# Patient Record
Sex: FEMALE | Race: WHITE | Employment: UNEMPLOYED | URBAN - METROPOLITAN AREA
[De-identification: names, ages, dates, MRNs, and addresses within clinical notes are randomized per-mention and may not be internally consistent; named-entity substitution may affect disease eponyms.]

---

## 2021-05-20 ENCOUNTER — OFFICE VISIT (OUTPATIENT)
Dept: SURGERY | Age: 46
End: 2021-05-20
Payer: OTHER GOVERNMENT

## 2021-05-20 VITALS
DIASTOLIC BLOOD PRESSURE: 100 MMHG | HEART RATE: 108 BPM | RESPIRATION RATE: 20 BRPM | HEIGHT: 63 IN | WEIGHT: 293 LBS | SYSTOLIC BLOOD PRESSURE: 194 MMHG | BODY MASS INDEX: 51.91 KG/M2 | TEMPERATURE: 99.5 F

## 2021-05-20 DIAGNOSIS — E66.01 MORBID OBESITY WITH BMI OF 50.0-59.9, ADULT (HCC): Primary | ICD-10-CM

## 2021-05-20 PROCEDURE — 99205 OFFICE O/P NEW HI 60 MIN: CPT | Performed by: SURGERY

## 2021-05-20 RX ORDER — MINERAL OIL
180 ENEMA (ML) RECTAL DAILY
COMMUNITY
End: 2022-05-24

## 2021-05-20 RX ORDER — POTASSIUM CHLORIDE 750 MG/1
TABLET, EXTENDED RELEASE ORAL
COMMUNITY
Start: 2021-02-22 | End: 2021-11-23

## 2021-05-20 RX ORDER — ACETAMINOPHEN 325 MG/1
TABLET ORAL
COMMUNITY

## 2021-05-20 RX ORDER — IPRATROPIUM BROMIDE AND ALBUTEROL 20; 100 UG/1; UG/1
SPRAY, METERED RESPIRATORY (INHALATION)
COMMUNITY
Start: 2021-04-26 | End: 2021-11-16

## 2021-05-20 RX ORDER — IPRATROPIUM BROMIDE AND ALBUTEROL SULFATE 2.5; .5 MG/3ML; MG/3ML
SOLUTION RESPIRATORY (INHALATION)
COMMUNITY
Start: 2021-02-22 | End: 2022-02-22

## 2021-05-20 RX ORDER — METFORMIN HYDROCHLORIDE 500 MG/1
500 TABLET ORAL 2 TIMES DAILY
COMMUNITY
Start: 2021-02-22 | End: 2021-11-16

## 2021-05-20 RX ORDER — OMEPRAZOLE 40 MG/1
40 CAPSULE, DELAYED RELEASE ORAL 2 TIMES DAILY
COMMUNITY
Start: 2021-01-06 | End: 2021-07-09

## 2021-05-20 RX ORDER — PROMETHAZINE HYDROCHLORIDE 12.5 MG/1
12.5 TABLET ORAL
COMMUNITY
End: 2021-11-16

## 2021-05-20 RX ORDER — LISINOPRIL 10 MG/1
10 TABLET ORAL DAILY
COMMUNITY
Start: 2021-02-22 | End: 2021-11-16

## 2021-05-20 RX ORDER — PREDNISONE 50 MG/1
TABLET ORAL
COMMUNITY
Start: 2021-02-22 | End: 2021-11-16

## 2021-05-20 RX ORDER — RIZATRIPTAN BENZOATE 5 MG/1
5 TABLET ORAL DAILY PRN
COMMUNITY
End: 2022-02-22

## 2021-05-20 RX ORDER — FUROSEMIDE 40 MG/1
40 TABLET ORAL DAILY
COMMUNITY
End: 2021-11-23

## 2021-05-20 RX ORDER — MONTELUKAST SODIUM 10 MG/1
10 TABLET ORAL
COMMUNITY

## 2021-05-20 RX ORDER — METOPROLOL SUCCINATE 50 MG/1
50 TABLET, EXTENDED RELEASE ORAL DAILY
COMMUNITY
Start: 2021-02-22 | End: 2021-11-23

## 2021-05-20 RX ORDER — GABAPENTIN 300 MG/1
300 CAPSULE ORAL 3 TIMES DAILY
COMMUNITY
End: 2022-02-22

## 2021-05-20 NOTE — PROGRESS NOTES
Matt Chapin is a 41 y.o. female who presents today with   Chief Complaint   Patient presents with    Morbid Obesity     Pt presents today to discuss surgical options            Body mass index is 54.21 kg/m². 1. Have you been to the ER, urgent care clinic since your last visit? Hospitalized since your last visit? No    2. Have you seen or consulted any other health care providers outside of the [address] [place] [address] since your last visit? Include any pap smears or colon screening.  No

## 2021-05-21 NOTE — PROGRESS NOTES
Consult    Patient: Kirk Vallejo MRN: [id number]  SSN: [id number]    YOB: 1975  Age: 41 y.o. Sex: female      Initial  Consultation for Bariatric Surgery     Kirk Vallejo is a 60-year-old white female who presents for discussion surgical options available for end of treatment of her clinically severe obesity. Onset obesity: Childhood  Weight at age 26: 190 pounds and a 5 foot 3 inch frame  Maximum/current weight 306 pounds on 5 foot 3 inch frame with body mass index of 54  Pattern/progression of weight gain: Slowly progressive interrupted by dietary weight loss followed by regain of lost weight as well as additional weight thus exhibiting yoyo effect after current maximum weight of 306 pounds  Max medical weight loss attempts: Multiple unsupervised and supervised weight loss trials with a maximum loss occurring in 2000 losing 35 pounds over 3 months  Comorbidities: Hypertension, Gastrosoft reflux disease, reactive airway disease, clinical obstructive sleep apnea, weight related arthropathy-knees, carpal tunnel tunnel syndrome/bilateral, polycystic ovarian syndrome  Current weight: 306. Body mass index 54. Ideal body weight: 128  Excess body weight: 178  Estimated postsurgical weight loss: 42  Postsurgical goal weight: 164  Allergies: Eucerin penicillin Reglan  Current medications: See medication list  Past medical history:  1. Super obesity with a body mass index of 54 with obesity related comorbidities of hypertension, Gastrosoft reflux disease, reactive airway disease, clinical obstructive sleep apnea, bilateral carpal tunnel syndrome, polycystic ovarian syndrome and weight related arthropathy-knees  2. Allergic rhinitis  3. History of nephrolithiasis status post ureteroscopy  4. History of peptic ulcer disease/perforated duodenal ulcer requiring laparoscopic Janith Bridgette patch 2015  Past surgical history:  1. Laparoscopic cholecystectomy 2000  2. Ureteroscopy x2 2006, 2013  3. Laparoscopic Doll Boards patch for perforated duodenal ulcer   Social history  Quit smoking in 2021 with a prior history approximately 20 years duration  Alcohol: None  Social history: Mother 72-clinic severe obesity, hypertension, coronary disease, adult-onset diabetes mellitus  Father  69-hypertension, congestive heart failure, renal failure  Sister 48-healthy    Allergies   Allergen Reactions    Eucerin [Mineral Oil-Isopropyl Myristat] Hives    Pcn [Penicillins] Hives    Reglan [Metoclopramide] Hives       Current Outpatient Medications on File Prior to Visit   Medication Sig Dispense Refill    Combivent Respimat  mcg/actuation inhaler       albuterol-ipratropium (DUO-NEB) 2.5 mg-0.5 mg/3 ml nebu USE 3 ML VIA NEBULIZER EVERY 4 HOURS AS NEEDED FOR SHORTNESS OF BREATH      lisinopriL (PRINIVIL, ZESTRIL) 10 mg tablet Take 10 mg by mouth daily.  metFORMIN (GLUCOPHAGE) 500 mg tablet Take 500 mg by mouth two (2) times a day.  metoprolol succinate (TOPROL-XL) 50 mg XL tablet Take 50 mg by mouth daily.  montelukast (SINGULAIR) 10 mg tablet Take 10 mg by mouth nightly.  omeprazole (PRILOSEC) 40 mg capsule Take 40 mg by mouth two (2) times a day.  potassium chloride (KLOR-CON) 10 mEq tablet TAKE 1 TABLET BY MOUTH TWICE DAILY WITH MEALS      fexofenadine (ALLEGRA) 180 mg tablet Take  by mouth.  furosemide (Lasix) 40 mg tablet Take  by mouth daily.  gabapentin (NEURONTIN) 300 mg capsule Take 300 mg by mouth three (3) times daily.  acetaminophen (TylenoL) 325 mg tablet Take  by mouth every four (4) hours as needed for Pain.  predniSONE (DELTASONE) 50 mg tablet TAKE 1 TABLET BY MOUTH EVERY DAY FOR 3 DOSES (Patient not taking: Reported on 2021)      rizatriptan (MAXALT) 5 mg tablet Take 5 mg by mouth daily as needed. (Patient not taking: Reported on 2021)      promethazine (PHENERGAN) 12.5 mg tablet Take 12.5 mg by mouth.  (Patient not taking: Reported on 2021)       No current facility-administered medications on file prior to visit. Past Medical History:   Diagnosis Date    Asthma     Diabetes ([address]     Hypertension     Multiple gastric ulcers     PCOS (polycystic ovarian syndrome)        Past Surgical History:   Procedure Laterality Date    HX CARPAL TUNNEL RELEASE Bilateral 2017    HX CHOLECYSTECTOMY  2000    HX GI      LITHOTRIPSY         Social History     Tobacco Use    Smoking status: Former Smoker     Types: Cigarettes     Quit date: 2021     Years since quittin.3    Smokeless tobacco: Never Used   Substance Use Topics    Alcohol use: Yes     Comment: occ    Drug use: Not Currently       Family History   Problem Relation Age of Onset    Heart Disease Mother     Hypertension Mother     Diabetes Mother     Dementia Mother     Stroke Mother     Heart Disease Father     Heart Failure Father     Kidney Disease Father          Review of Systems:      General: Denies fevers, chills, night sweats, fatigue, weight loss, or weight gain. HEENT: Denies changes in auditory or visual acuity, recurrent pharyngitis, epistaxis, chronic rhinorrhea, vertigo    Respiratory: Denies increasing shortness of breath, productive cough, hemoptysis    Cardiac: Denies known history of cardiac disease, heart murmur, palpitations    GI: Denies dysphagia, recurrent emesis, hematemesis, changes in bowel habits, hematochezia, melena    : Denies hematuria frequency urgency dysuria    Musculoskeletal: Denies fractures, dislocations    Neurologic: Denies history of CVA, paralysis paresthesias, recurrent cephalgia, seizures    Endocrine: Denies polyuria, polydipsia, polyphagia, heat and cold intolerance    Lymph/heme: Denies a history of malignancy, anemia, bruising, blood transfusions    Integumentary: Negative for dermatitis         Physical Exam    Visit Vitals  BP (!) 194/100 (BP 1 Location: Left upper arm, BP Patient Position:  At rest, BP Cuff Size: Adult)   Pulse (!) 108   Temp 99.5 °F (37.5 °C) (Oral)   Resp 20   Ht 5' 3\" (1.6 m)   Wt 138.8 kg (306 lb)   BMI 54.21 kg/m²       Nursing note reviewed. General: Clinically severely obese in no acute distress, nontoxic in appearance. Head: Normocephalic, atraumatic  Mouth: Clear, no overt lesions, oral mucosa is pink and moist.  Neck: Supple, no masses, no adenopathy or carotid bruits, trachea midline  Resp: Clear to auscultation bilaterally, no wheezing, rhonchi, or rales, excursions normal and symmetrical.  Cardio: Regular rate and rhythm, no murmurs, clicks, gallops, or rubs. Abdomen: Obese, soft, nontender, nondistended, normoactive bowel sounds, no hernias. Extremities: Warm, well perfused, no tenderness or swelling, normal gait/station, without edema or varicosities  Neuro: Sensation and strength grossly intact and symmetrical.  Psych: Alert and oriented to person, place, and time. Impression/Plan:    72-year-old white female with a body mass index of 54 with obesity related comorbidities hypertension, Gastrosoft reflux disease, reactive airway disease, clinical obstructive sleep apnea, carpal tunnel syndrome/bilateral, polycystic ovarian syndrome, weight needed arthropathy-knees* who would benefit from bariatric surgery. We have had an extensive discussion with regard to the risks, benefits and likely outcomes of the operation. We've discussed the restrictive and malabsorptive nature of the gastric bypass and compared and contrasted with the sleeve gastrectomy. The patient understands the likelihood of losing approximately 80% of their excess weight in 12 to 18 months. The patient also understands the risks including but not limited to bleeding, infection, need for reoperation, ulcers, leaks and strictures, bowel obstruction secondary to adhesions and internal hernias, DVT, PE, heart attack, stroke, and death.  Patient also understands risks of inadequate weight loss, excess weight loss, vitamin insufficiency, protein malnutrition, excess skin, and loss of hair. We have reviewed the components of a successful postoperative course including requirement for a high protein, low carbohydrate diet, 60 oz a day of zero calorie liquids, daily vitamin supplementation, daily exercise, regular follow-up, and participation in support groups.  At this time we will enroll the patient in our bariatric program, undertake routine laboratory evaluation, chest X-ray, EKG, possible UGI and evaluation by  nutritionist as well as psychologist and pending their satisfactory completion of the preop evaluation, plan to pursue laparoscopic potentially open gastric bypass to achieve definitive durable weight loss on a personal level with expected resolution of obesity related comorbidities

## 2021-06-02 ENCOUNTER — HOSPITAL ENCOUNTER (OUTPATIENT)
Dept: BARIATRICS/WEIGHT MGMT | Age: 46
Discharge: HOME | End: 2021-06-02

## 2021-06-02 NOTE — PROGRESS NOTES
[facility]  [facility], [address]    Patient's Name: Connor Hayes   Age: 41 y.o. YOB: 1975   Sex: female    Date:   6/2/2021    Session: 1 of  3  Surgeon:  Tam Harley    Height: 63\" Weight:    300      Lbs  BMI: 53.1    Starting Weight: 306 lbs     Overall Pounds Lost: 6   Overall Pounds Gained: 0      Do you smoke? Pt does not smoke    Alcohol intake: Pt does not drink  Number of drinks at a time:  0  Number of times a week: 0    Class Guidelines    Guidelines are reviewed with patient at the start of every class. 1. Patient understands that weight loss trial classes must be consecutive. Patient understands if they miss a class, it is their responsibility to contact me to reschedule class. I will reach out to patient after their first no show. 2.  Patient understands the expectations that weight maintenance/weight loss is expected during the classes. Failure to demonstrate changes may result in one extra month of weight loss trial, followed by going back to see the surgeon. 3. Patient is also instructed to be doing their labs, blood work, psych visit, support group and any other test that the surgeon has used while they are working on their weight loss trial.    Changes Made: cut out soda; working on carb intake; walking daily      Dietary Instruction    During today's class, we continued to focus on the key diet principles. Patient was instructed to follow a low carbohydrate diet, focusing on meat and vegetables. Patient was instructed to stop liquid calories and aim for 64 ounces of water per day. We focused on focusing in on bigger problem areas to start making changes to, such as reducing fast food intake, reducing carbonated beverages/soda intake, decreasing carbohydrates intake daily, etc. We reviewed protein shakes and high protein yogurts to chose, as well.      We also reviewed some busted some myths associated with Bariatric surgery during today's webinar. The myths we covered include:  1. Anyone is eligible for surgery - There is a list of criteria and must show the interdisciplinary team that you are actively making changes with diet, exercise and food behaviors. 2. Bariatric Surgery is taking the \"easy way out\" -- Surgery is just a tool. Habits must be changed and this is a life-long process. Surgery can be viewed as a jumpstart. 3. After getting surgery, you can go back to your old eating habits -- Your body will most likely not allow this, due to symptoms such as dumping syndrome, nausea, and vomiting that may come about when eating the wrong foods or amounts. 4. Bariatric Surgery can lead to a transfer of addictions -- Addictions may come about for multiple reasons. It may be important to speak with a mental health professional if you have a family history of addictions. 5. Patients will have nutritional deficiencies after surgery -- As long as patients are taking all recommended vitamins daily, deficiencies are not likely. 6. I will be able to follow a Keto diet or Intermittent fasting -- We stress the importance of following a diet that is high in protein and low in fat and sugars/carbs after surgery, as well as ensuring consistent protein intake throughout the day to keep blood sugars controlled. 7. I am going to constantly be hungry after surgery -- The portion sizes after sugery may seem small to patients prior to surgery, however, the small portions will be more than enough to keep you full and satisfied at each meal.   8. I should not get pregnant after having bariatric surgery -- Pregnancy may actually be safer for both mom and baby after surgery. It is recommended to wait 18 months post-op to conceive.    Patient was educated on the bariatric process within nutrition, and how to avoid and weed through some inaccurate information and opinions that they may read on the Internet regarding bariatric surgery. Patient's dietary habits include: Eating 2 meals daily, consisting of steak, veggies, mashed potatoes w/ gravy. Biggest portion of meal comes from starch and meat. Starches consumed: rice, potatoes, crackers. Snacking 3x/day on cheese and crackers; carrots and ranch; celery and PB. Struggling to let go of rice and potatoes. Drinking 8 oz of coffee, 48 oz of water and 36-48 oz of tea. Physical Activity/Exercise    Comments:     Currently for exercise, patient is walking every day. We talked about activities for patient to do, including walking, swimming, or chair exercises, as well as some additional steps to take in the day to get extra movement, such as parking further away, walking dog, taking stairs vs elevator, etc. Patient was encouraged to start up an exercise routine and build on it. Behavior Modification  Comments:   Emphasized the importance of eating slowly, not eating and drinking meals at the same time. Discussed Key Behavior principles to start implementing to be successful following surgery, such as, importance of 3 meals daily, keeping a food journal, avoid distractions during meal times, and chewing food thoroughly, taking 20-30 minutes to eat a meal, as a few examples. Patient understands the importance of following through with these behaviors following surgery to aid in long term weight loss. Tips and advice were given on how to start implementing these into the patient's life. Patient's S.M.A.R.T. Goals are:  1. Walk more  2. Eat better  3. Less sweets     Patient has not attended a support group meeting.       Arjun Kong RD  6/2/2021

## 2021-07-01 ENCOUNTER — HOSPITAL ENCOUNTER (OUTPATIENT)
Dept: BARIATRICS/WEIGHT MGMT | Age: 46
Discharge: HOME | End: 2021-07-01

## 2021-07-01 NOTE — PROGRESS NOTES
[facility]  [facility], [address]    Patient's Name: Damian Chau   Age: 41 y.o. YOB: 1975   Sex: female    Date:   7/1/2021    Session: 2 of  3  Surgeon:  Becky Valenzuela    Height: 63\" Weight:   299     Lbs  BMI: 52     Previous Month's Weight: 300  Pounds Lost since last month: 1                 Pounds Gained since last month: 0    Starting Weight: 306     Overall Pounds Lost: 7   Overall Pounds Gained: 0      Do you smoke? Pt does not smoke    Alcohol intake: Pt does not drink  Number of drinks at a time:  0  Number of times a week: 0    Class Guidelines    Guidelines are reviewed with patient at the start of every class. 1. Patient understands that weight loss trial classes must be consecutive. Patient understands if they miss a class, it is their responsibility to contact me to reschedule class. I will reach out to patient after their first no show. 2.  Patient understands the expectations that weight maintenance/weight loss is expected during the classes. Failure to demonstrate changes may result in one extra month of weight loss trial, followed by going back to see the surgeon. 3. Patient is also instructed to be doing their labs, blood work, psych visit, support group and any other test that the surgeon has used while they are working on their weight loss trial.    Changes Made: Eating off a smaller plate; trying protein shakes      Dietary Instruction    During today's class, we continued to focus on the key diet principles. Patient was instructed to follow a low carbohydrate diet, focusing on meat and vegetables. Patient was instructed to stop liquid calories and aim for 64 ounces of water per day.  We focused on focusing in on bigger problem areas to start making changes to, such as reducing fast food intake, reducing carbonated beverages/soda intake, decreasing carbohydrates intake daily, etc. We reviewed protein shakes and high protein yogurts to chose, as well. We also reviewed some ways to stay on track with diet, food behavior, and exercise goals, which included:  1. Portion control and setting up plates with 1/2 veggies, 1/4 protein and 1/4 starch or fruit. a. Using smaller plates and utensils  b. Looking at servings sizes  2. Making healthy protein choices, with low-fat/lean sources of meat examples  3. Menu guide when dining out and choosing baked, grilled, broiled foods, and avoiding foods that are naturally higher in fat and carbohydrates, such as \"kelly, au gratin, battered, breaded, deep-fried\" items listed on the menus. a. Requesting sauces and dressings on the side. 4. Navigating through common food triggers, such as eating in front of the TV, raiding the fridge after coming home from work, and eating out of emotions. Action plans included making the TV a no eating zone, having more consistency with meals, and differentiating between emotional hunger and physical hunger. 5. Decreasing visual temptations tips such as sticking to a grocery list, shopping when not hungry, keeping problem foods away, and keeping healthier foods/snack items in closer reach. 6. Limiting umber of times eating throughout the day, places where eating and watching out for danger zones to help stay on track! 7. Keeping a food journal or food tracking cheng to increase accountability  8. Starting up an exercise routine, as simple as walking 10-15 minutes/day to begin with, and building on it throughout the next few months. a. Stress reliever  b. Increase in metabolism  c. Reduces blood pressure  d. Continual weight loss  9. Getting a walking kathryn to help increase accountability. 10. Ways to prevent relapse, included:  a. Recognizing reactions  b. Come in with a plan  c. Remove temptations or environments that are conducive to temptations.     Patient's dietary habits include: Eating 3 meals daily, consisting of chicken and vegetables and often finds herself filling up on the veggies. Starches: pasta, fruit, starchy veggies. Snacking 1x/day on fruits and veggies. Struggling w/ starches in her diet. Drinking 72 oz of water, 16 oz of which are w/ crystal light. Drinking 8 oz of fruit juiice and 16 oz of smoothies. Caffeine intake @ 8-16 oz. Physical Activity/Exercise    Comments:     Currently for exercise, patient is walking or swimming daily. We talked about activities for patient to do, including walking, swimming, or chair exercises, as well as some additional steps to take in the day to get extra movement, such as parking further away, walking dog, taking stairs vs elevator, etc. Patient was encouraged to start up an exercise routine and build on it. Behavior Modification  Comments:   Emphasized the importance of eating slowly, not eating and drinking meals at the same time. Discussed Key Behavior principles to start implementing to be successful following surgery, such as, importance of 3 meals daily, keeping a food journal, avoid distractions during meal times, and chewing food thoroughly, taking 20-30 minutes to eat a meal, as a few examples. Patient understands the importance of following through with these behaviors following surgery to aid in long term weight loss. Tips and advice were given on how to start implementing these into the patient's life. Patient's S.M.A.R.T. Goals are:  1. Less snacking  2. Exercise more  3. Watching what I eat and where I eat    Patient has not attended a support group meeting. Patient has noticed that her clothes have been fitting a bit loser and she has been breathing better.     Rehan Hidalgo, CAMERON  7/1/2021

## 2021-07-02 ENCOUNTER — HOSPITAL ENCOUNTER (OUTPATIENT)
Dept: LAB | Age: 46
Discharge: HOME | End: 2021-07-02
Payer: OTHER GOVERNMENT

## 2021-07-02 ENCOUNTER — HOSPITAL ENCOUNTER (OUTPATIENT)
Dept: GENERAL RADIOLOGY | Age: 46
Discharge: HOME | End: 2021-07-02
Payer: OTHER GOVERNMENT

## 2021-07-02 ENCOUNTER — CLINICAL SUPPORT (OUTPATIENT)
Dept: SURGERY | Age: 46
End: 2021-07-02

## 2021-07-02 ENCOUNTER — HOSPITAL ENCOUNTER (OUTPATIENT)
Dept: LAB | Age: 46
Discharge: HOME | End: 2021-07-02

## 2021-07-02 VITALS
DIASTOLIC BLOOD PRESSURE: 123 MMHG | HEART RATE: 84 BPM | RESPIRATION RATE: 16 BRPM | BODY MASS INDEX: 51.91 KG/M2 | OXYGEN SATURATION: 100 % | SYSTOLIC BLOOD PRESSURE: 194 MMHG | HEIGHT: 63 IN | WEIGHT: 293 LBS | TEMPERATURE: 98.1 F

## 2021-07-02 DIAGNOSIS — E66.01 MORBID OBESITY WITH BMI OF 50.0-59.9, ADULT (HCC): ICD-10-CM

## 2021-07-02 DIAGNOSIS — E66.9 OBESITY, UNSPECIFIED CLASSIFICATION, UNSPECIFIED OBESITY TYPE, UNSPECIFIED WHETHER SERIOUS COMORBIDITY PRESENT: Primary | ICD-10-CM

## 2021-07-02 LAB
25(OH)D3 SERPL-MCNC: 18.5 NG/ML (ref 30–100)
ALBUMIN SERPL-MCNC: 3.6 G/DL (ref 3.4–5)
ALBUMIN/GLOB SERPL: 0.9 {RATIO} (ref 0.8–1.7)
ALP SERPL-CCNC: 102 U/L (ref 45–117)
ALT SERPL-CCNC: 48 U/L (ref 13–56)
ANION GAP SERPL CALC-SCNC: 5 MMOL/L (ref 3–18)
APPEARANCE UR: CLEAR
AST SERPL-CCNC: 21 U/L (ref 10–38)
ATRIAL RATE: 81 BPM
BACTERIA URNS QL MICRO: NEGATIVE /HPF
BILIRUB SERPL-MCNC: 0.3 MG/DL (ref 0.2–1)
BILIRUB UR QL: NEGATIVE
BUN SERPL-MCNC: 13 MG/DL (ref 7–18)
BUN/CREAT SERPL: 13 (ref 12–20)
CALCIUM SERPL-MCNC: 8.7 MG/DL (ref 8.5–10.1)
CALCULATED P AXIS, ECG09: 16 DEGREES
CALCULATED R AXIS, ECG10: 60 DEGREES
CALCULATED T AXIS, ECG11: 51 DEGREES
CHLORIDE SERPL-SCNC: 108 MMOL/L (ref 100–111)
CHOLEST SERPL-MCNC: 221 MG/DL
CO2 SERPL-SCNC: 26 MMOL/L (ref 21–32)
COLOR UR: YELLOW
CREAT SERPL-MCNC: 1.03 MG/DL (ref 0.6–1.3)
DIAGNOSIS, 93000: NORMAL
EPITH CASTS URNS QL MICRO: ABNORMAL /LPF (ref 0–5)
FERRITIN SERPL-MCNC: 21 NG/ML (ref 8–388)
FOLATE SERPL-MCNC: 6 NG/ML (ref 3.1–17.5)
GLOBULIN SER CALC-MCNC: 3.9 G/DL (ref 2–4)
GLUCOSE SERPL-MCNC: 90 MG/DL (ref 74–99)
GLUCOSE UR STRIP.AUTO-MCNC: NEGATIVE MG/DL
HDLC SERPL-MCNC: 48 MG/DL (ref 40–60)
HDLC SERPL: 4.6 {RATIO} (ref 0–5)
HGB UR QL STRIP: NEGATIVE
IRON SERPL-MCNC: 28 UG/DL (ref 50–175)
KETONES UR QL STRIP.AUTO: NEGATIVE MG/DL
LDLC SERPL CALC-MCNC: 138.2 MG/DL (ref 0–100)
LEUKOCYTE ESTERASE UR QL STRIP.AUTO: NEGATIVE
LIPID PROFILE,FLP: ABNORMAL
NITRITE UR QL STRIP.AUTO: NEGATIVE
P-R INTERVAL, ECG05: 180 MS
PH UR STRIP: 6 [PH] (ref 5–8)
POTASSIUM SERPL-SCNC: 4.1 MMOL/L (ref 3.5–5.5)
PROT SERPL-MCNC: 7.5 G/DL (ref 6.4–8.2)
PROT UR STRIP-MCNC: 30 MG/DL
Q-T INTERVAL, ECG07: 402 MS
QRS DURATION, ECG06: 88 MS
QTC CALCULATION (BEZET), ECG08: 466 MS
RBC #/AREA URNS HPF: NEGATIVE /HPF (ref 0–5)
SODIUM SERPL-SCNC: 139 MMOL/L (ref 136–145)
SP GR UR REFRACTOMETRY: 1.02 (ref 1–1.03)
TRIGL SERPL-MCNC: 174 MG/DL (ref ?–150)
TSH SERPL DL<=0.05 MIU/L-ACNC: 2.15 UIU/ML (ref 0.36–3.74)
UROBILINOGEN UR QL STRIP.AUTO: 0.2 EU/DL (ref 0.2–1)
VENTRICULAR RATE, ECG03: 81 BPM
VIT B12 SERPL-MCNC: 307 PG/ML (ref 211–911)
VLDLC SERPL CALC-MCNC: 34.8 MG/DL
WBC URNS QL MICRO: NEGATIVE /HPF (ref 0–5)

## 2021-07-02 PROCEDURE — 80061 LIPID PANEL: CPT

## 2021-07-02 PROCEDURE — 80307 DRUG TEST PRSMV CHEM ANLYZR: CPT

## 2021-07-02 PROCEDURE — 93005 ELECTROCARDIOGRAM TRACING: CPT

## 2021-07-02 PROCEDURE — 82306 VITAMIN D 25 HYDROXY: CPT

## 2021-07-02 PROCEDURE — 84425 ASSAY OF VITAMIN B-1: CPT

## 2021-07-02 PROCEDURE — 82728 ASSAY OF FERRITIN: CPT

## 2021-07-02 PROCEDURE — 83013 H PYLORI (C-13) BREATH: CPT

## 2021-07-02 PROCEDURE — 84443 ASSAY THYROID STIM HORMONE: CPT

## 2021-07-02 PROCEDURE — 80053 COMPREHEN METABOLIC PANEL: CPT

## 2021-07-02 PROCEDURE — 81001 URINALYSIS AUTO W/SCOPE: CPT

## 2021-07-02 PROCEDURE — 83540 ASSAY OF IRON: CPT

## 2021-07-02 PROCEDURE — 82607 VITAMIN B-12: CPT

## 2021-07-02 PROCEDURE — 36415 COLL VENOUS BLD VENIPUNCTURE: CPT

## 2021-07-02 PROCEDURE — 71046 X-RAY EXAM CHEST 2 VIEWS: CPT

## 2021-07-02 NOTE — PROGRESS NOTES
Nagi Phan is a 41 y.o. female  Chief Complaint   Patient presents with    Testing     H-pylori testing     H-Pylori test conducted and sent to lab for resulting. Pt will be notified of results. Ms. Herve Queen has been given the following recommendations today due to her elevated BP reading: referred to Alternative/PCP. Pt did not take bp meds yet today. She states she is not experiencing any neg symptoms and feels good. She is following up with her PCP for her elevated blood pressure. 1. Have you been to the ER, urgent care clinic since your last visit? Hospitalized since your last visit? No    2. Have you seen or consulted any other health care providers outside of the [address] [place] [address] since your last visit? Include any pap smears or colon screening.  No

## 2021-07-03 LAB
COTININE UR QL SCN: NEGATIVE NG/ML
DRUG SCREEN COMMENT:, 753798: NORMAL
UREA BREATH TEST QL: POSITIVE

## 2021-07-08 LAB — VIT B1 BLD-SCNC: 137.4 NMOL/L (ref 66.5–200)

## 2021-07-09 DIAGNOSIS — A04.8 H. PYLORI INFECTION: Primary | ICD-10-CM

## 2021-07-09 DIAGNOSIS — E66.01 MORBID OBESITY WITH BMI OF 50.0-59.9, ADULT (HCC): ICD-10-CM

## 2021-07-09 RX ORDER — OMEPRAZOLE 20 MG/1
20 CAPSULE, DELAYED RELEASE ORAL 2 TIMES DAILY
Qty: 28 CAPSULE | Refills: 0 | Status: SHIPPED | OUTPATIENT
Start: 2021-07-09 | End: 2021-07-23

## 2021-07-09 RX ORDER — CLARITHROMYCIN 500 MG/1
500 TABLET, FILM COATED ORAL 2 TIMES DAILY
Qty: 28 TABLET | Refills: 0 | Status: SHIPPED | OUTPATIENT
Start: 2021-07-09 | End: 2021-07-23

## 2021-07-09 RX ORDER — METRONIDAZOLE 500 MG/1
500 TABLET ORAL 2 TIMES DAILY
Qty: 28 TABLET | Refills: 0 | Status: SHIPPED | OUTPATIENT
Start: 2021-07-09 | End: 2021-07-23

## 2021-08-05 ENCOUNTER — DOCUMENTATION ONLY (OUTPATIENT)
Dept: BARIATRICS/WEIGHT MGMT | Age: 46
End: 2021-08-05

## 2021-08-05 ENCOUNTER — HOSPITAL ENCOUNTER (OUTPATIENT)
Dept: BARIATRICS/WEIGHT MGMT | Age: 46
Discharge: HOME | End: 2021-08-05

## 2021-08-05 NOTE — PROGRESS NOTES
[facility]  [facility], [address]    Patient's Name: Shasta Rowan   Age: 41 y.o. YOB: 1975   Sex: female    Date:   8/5/2021    Session: 3 of  3  Surgeon:  Jose Juan Medina    Height: 63\" Weight:    295      Lbs  BMI: 52     Previous Month's Weight: 299  Pounds Lost since last month: 4                 Pounds Gained since last month: 0    Starting Weight: 306     Overall Pounds Lost: 11   Overall Pounds Gained: 0      Do you smoke? Pt does not smoke    Alcohol intake: Pt does not drink  Number of drinks at a time:  0  Number of times a week: 0    Class Guidelines    Guidelines are reviewed with patient at the start of every class. 1. Patient understands that weight loss trial classes must be consecutive. Patient understands if they miss a class, it is their responsibility to contact me to reschedule class. I will reach out to patient after their first no show. 2.  Patient understands the expectations that weight maintenance/weight loss is expected during the classes. Failure to demonstrate changes may result in one extra month of weight loss trial, followed by going back to see the surgeon. 3. Patient is also instructed to be doing their labs, blood work, psych visit, support group and any other test that the surgeon has used while they are working on their weight loss trial.    Changes Made: Eating more protein and veggies      Dietary Instruction    During today's class, we continued to focus on the key diet principles. Patient was instructed to follow a low carbohydrate diet, focusing on meat and vegetables. Patient was instructed to stop liquid calories and aim for 64 ounces of water per day.  We focused on focusing in on bigger problem areas to start making changes to, such as reducing fast food intake, reducing carbonated beverages/soda intake, decreasing carbohydrates intake daily, etc. We reviewed protein shakes and high protein yogurts to chose, as well. We also reviewed some ways to combat emotional eating and cravings, which included:  1. Recognizing what physical hunger feels like vs emotional hunger/cravings  2. Recognizing triggers, whether psychological, emotional or food-related. 3. Starting to listen to what your body is telling you and admitting that there is a habit in order to break it  4. Breaking the association of feelings w/ food and replacing with alternative activities in place of eating, such as meditation, exercise, crafting, new hobbies, positive self talk, etc.  5. Working on not skipping meals to reduce cravings. 6. Confrontation vs. Distraction strategies in fighting emotional eating and cravings  7. Ways to curb cravings, such as sucking on a sour pickle, making TV a no eating zone, fool your sweet tooth, etc.  8. Working on stress reduction and managing stress in areas not revolving around food. Patient's dietary habits include: Eating 3 meals daily, consisting of protein and veggies and often finds herself filling up on veggies. Starches come from fruits, which she snacks on. She reports she often finds herself struggling with getting all nutrients that she needs daily. Drinking 64 oz of water, 16 oz of which come from crystal light. Drinking gatorade zero 16 oz daily. Physical Activity/Exercise    Comments:     Currently for exercise, patient is walking, swimming or using resistance bands/free weights daily. We talked about activities for patient to do, including walking, swimming, or chair exercises, as well as some additional steps to take in the day to get extra movement, such as parking further away, walking dog, taking stairs vs elevator, etc. Patient was encouraged to start up an exercise routine and build on it. Behavior Modification  Comments:   Emphasized the importance of eating slowly, not eating and drinking meals at the same time.   Discussed Key Behavior principles to start implementing to be successful following surgery, such as, importance of 3 meals daily, keeping a food journal, avoid distractions during meal times, and chewing food thoroughly, taking 20-30 minutes to eat a meal, as a few examples. Patient understands the importance of following through with these behaviors following surgery to aid in long term weight loss. Tips and advice were given on how to start implementing these into the patient's life. Patient's S.M.A.R.T. Goals are:  1. Eating smaller portions  2. More exercise  3. Shift focus/priorities to self. Patient has attended a support group meeting. Patient has noticed that clothes are fitting better.       Jorge Luis Jones, CAMERON  8/5/2021

## 2021-09-01 ENCOUNTER — HOSPITAL ENCOUNTER (OUTPATIENT)
Dept: BARIATRICS/WEIGHT MGMT | Age: 46
Discharge: HOME | End: 2021-09-01

## 2021-09-01 ENCOUNTER — DOCUMENTATION ONLY (OUTPATIENT)
Dept: BARIATRICS/WEIGHT MGMT | Age: 46
End: 2021-09-01

## 2021-09-01 NOTE — PROGRESS NOTES
Nutrition Evaluation    Patient's Name: Shasta Rowan   Age: 40 y.o. YOB: 1975   Sex: female    Height: 5 f 3 Weight: 295 BMI:  52.6  Starting Weight:  306        Smoking Status:  None  Alcohol Intake:  Number of Drinks at a Time: None  Number of Times a Week: None    Changes made during classes include:  Lowered carbohydrates  Exercise  Water only    Summary:  I feel that Shasta Rowan has demonstrated appropriate diet changes and is ready to move forward with surgery. Patient has been briefed on the importance of the protein drinks, vitamins, and the transition of the diet stages. Patient understands that the long-term diet will focus on protein and vegetables. Patient understand the effects of carbohydrates after surgery and what reactive hypoglycemia is. Patient is aware that they will be attending pre-op class 2 weeks before surgery and will get more detailed information on the post-op diet guidelines. Patient will see me again at 6 weeks post-op. At this 6 week visit, RD will assess how patient is tolerating soft protein and advance to vegetables, if tolerating soft protein without difficulty. Patient will also see RD again at 9 months post-op. This visit will assess patient's compliance with current protocol, including diet, vitamins, protein shakes, and exercise. Post-op diet guidelines will be reinforced. RD is available for questions and to meet with patient outside of the 6 week and 9 month post-op visit. We spent a lot of time talking about the vitamins. Patient understands the importance of being compliant with the diet protocol and the complications and risks that can occur if they are non-compliant with the nutritional protocol. Patient has attended at least one support group.     Candidate for surgery: Yes  Re-evaluation Date:     Procedure:  Gastric Bypass     Ngoc Rueda 87 RD  9/1/2021

## 2021-09-01 NOTE — PROGRESS NOTES
[address] [facility] [address] [facility], [address]    Patient's Name: Sylvester Mena   Age: 40 y.o. YOB: 1975   Sex: female    Date:   9/1/2021    Insurance:              Session: 4   Surgeon:  Dr. Amy Hassan    Height: 5 f 3   Weight:    295      Lbs. BMI:    Pounds Lost since last month: 0                Pounds Gained since last month: 0    Starting Weight: 306     Previous Months Weight: 295  Overall Pounds Lost: 11   Overall Pounds Gained: 0    Smoking:  None    Alcohol intake:  Number of drinks at a time:  None  Number of times a week: None    Class Guidelines    Guidelines are reviewed with patient at the start of every class. 1. Patient understands that weight loss trial classes must be consecutive. Patient understands if they miss a class, it is their responsibility to contact me to reschedule class. I will reach out to patient after their first no show. 2.  Patient understands the expectations that weight maintenance/weight loss is expected during the classes. Failure to demonstrate changes may result in one extra month of weight loss trial, followed by going back to see the surgeon. 3. Patient is also instructed to be doing their labs, blood work, psych visit, support group and any other test that the surgeon has used while they are working on their weight loss trial.    Other Pertinent Information:     Changes Made Since Last Class: Less than 30 grams per day of carbohydrates. More protein. Eating Habits and Behaviors      During today's class, we continued to focus on the key diet principles. Patient was instructed to follow a low carbohydrate diet, focusing on meat and vegetables. Patient was instructed to stop liquid calories and aim for 64 ounces of water per day.  We focused on focusing in on bigger problem areas to start making changes to, such as reducing fast food intake, reducing carbonated beverages/soda intake, decreasing carbohydrates intake daily, etc. We reviewed protein shakes and high protein yogurts to chose, as well. During today's class, we also talked about how to read a label. Patient was given information on:  1. The benefits of reading a label, which allowed one to compare the nutritional value of similar products and make healthy food decisions. 2. The ingredient list, which can help to determine if the food is heathy or something that fits into the diet. 3. The importance of reading the serving size and making sure to apply that to the portion size that they are consuming. Patient was also educated on carbohydrates. I talked to patient about:  1. The function of carbohydrates. 2. Foods that carbohydrate-heavy. 3. Patient was given the guidelines to keep their carbohydrates less than 75 grams per day in the pre-op phase. 4. Patient was also given ideas of low carb swaps, which include zucchini noodles, spaghetti squash, or cauliflower rice. 5. Lower carbohydrate fruit options were discussed. 6. Discussed lower carb swaps to use instead of potato chips. Patient's dietary habits include: Patient is eating 3 meal per day. Meals are made up of eggs, turkey sausage, turkey johnson, eggplant, mushrooms. Portions are:  Small plate. Patient is eating out: 0. Patient is snacking on lunch meat, string cheese, vegetables. I have talked to patient about some lower carbohydrate snack choices that focused more protein. Patient is drinking 65 ounces of fluid per day. Fluid intake is make up of: water. Physical Activity/Exercise     Comments:     Currently for exercise, patient walking 30 minutes per day. I have talked to patient about some suggestions to start an exercise routine. Patient is encouraged to purchase a pedometer and use this to track her steps. I have made some suggestions to patient of ways to incorporate exercise in with a busy lifestyle.   We also talked about You Tube videos that can be used for an exercise routine. Behavior Modification  Comments:  Behavior modifications were discussed with the patient. Some of those behavior modifications include:  1. Emphasized the importance of eating slowly, not eating and drinking meals at the same time. 2.  Taking 20-30 minutes to eat a meal  3. I have encouraged patient to follow journal, which may be done by paper or tracking it an cheng, such as My Fitness Pal or [employer]. [address] [place]. Patient understands the importance of following through with these behaviors following surgery to aid in long term weight loss. Tips and advice were given on how to start implementing these into the patient's life. Patient has attended the required bariatric support group. Goal patient has set for next month:  1. Walk further faster  2.   Sign up for gym    Quintin Reyes 112  9/1/2021

## 2021-09-20 ENCOUNTER — TELEPHONE (OUTPATIENT)
Dept: SURGERY | Age: 46
End: 2021-09-20

## 2021-09-20 NOTE — TELEPHONE ENCOUNTER
Had message to call pt due to abd pain statrted x 1 week feels it under rib cage area feels similar when she had h-pylori before back in July does have history of ulcer states she has referral for gi from [employer] that she is waiting on took otc prilosec and pain is better discussed with Daisy Quintanilla NP to continue with her prilosec and to schedule appt with GI

## 2021-10-07 ENCOUNTER — OFFICE VISIT (OUTPATIENT)
Dept: SURGERY | Age: 46
End: 2021-10-07
Payer: OTHER GOVERNMENT

## 2021-10-07 VITALS
RESPIRATION RATE: 20 BRPM | BODY MASS INDEX: 50.32 KG/M2 | TEMPERATURE: 97.3 F | SYSTOLIC BLOOD PRESSURE: 142 MMHG | DIASTOLIC BLOOD PRESSURE: 91 MMHG | HEART RATE: 107 BPM | HEIGHT: 63 IN | WEIGHT: 284 LBS

## 2021-10-07 DIAGNOSIS — E66.01 MORBID OBESITY WITH BMI OF 50.0-59.9, ADULT (HCC): Primary | ICD-10-CM

## 2021-10-07 PROCEDURE — 99214 OFFICE O/P EST MOD 30 MIN: CPT | Performed by: SURGERY

## 2021-10-07 RX ORDER — ESCITALOPRAM OXALATE 10 MG/1
10 TABLET ORAL DAILY
COMMUNITY

## 2021-10-07 NOTE — PROGRESS NOTES
Alfredo Sanabria is a 40 y.o. female who presents today with   Chief Complaint   Patient presents with    Morbid Obesity     Pt presents today to discuss surgical options. Body mass index is 50.31 kg/m². 1. Have you been to the ER, urgent care clinic since your last visit? Hospitalized since your last visit? No    2. Have you seen or consulted any other health care providers outside of the [address] [place] [address] since your last visit? Include any pap smears or colon screening.  No

## 2021-10-07 NOTE — PROGRESS NOTES
Preop History and Physical Exam:    Kuldeep Brewer is a 40 y.o. female who was initially evaluated in this office on May 20, 2021 for discussion surgical options available for the definitive management of her super obesity. The patient that time weighed 306 pounds on a 5 foot 3 inch frame with a body mass index of 54 with obesity related comorbidities of hypertension, hypercholesterolemia, gastroesophageal reflux disease, reactive airway disease, clinical obstructive sleep apnea, polycystic ovarian syndrome, post carpal tunnel syndrome, and weight related arthropathy. The patient after discussing surgical options of L4 the definitive weight loss is like to pursue laparoscopic potentially open Scott-en-Y gastric bypass to achieve definitive durable weight loss on a personal level expected resolution of obesity related comorbidities. The patient returns today after completing all multidisciplinary bariatric surgical programmatic requirements necessary prior to pursuit of surgery for the discussion of the diagnostic evaluation and surgical scheduling noting no new medical or surgical history. Patient currently weighs 284 pounds and a 5 feet 3 inches frame with a body mass index of 50 with obesity related comorbidities of hypertension, hypercholesterolemia, gastroesophageal reflux disease, reactive airway disease, clinical obstructive sleep apnea, polycystic ovarian syndrome, bilateral carpal tunnel syndrome, weight related arthropathy.   Ideal body weight 120 pounds, excess body weight 156 pounds, estimated postsurgical weight loss based on 8% loss of excess body weight 125 pounds, postsurgical goal weight 160 pounds    Past Medical History:   Diagnosis Date    Asthma     Diabetes ([address]     Hypertension     Multiple gastric ulcers     PCOS (polycystic ovarian syndrome)        Past Surgical History:   Procedure Laterality Date    HX CARPAL TUNNEL RELEASE Bilateral 2017    HX CHOLECYSTECTOMY  2000    HX GI  LITHOTRIPSY         Current Outpatient Medications   Medication Sig Dispense Refill    escitalopram oxalate (Lexapro) 10 mg tablet Take 10 mg by mouth daily.  Combivent Respimat  mcg/actuation inhaler       albuterol-ipratropium (DUO-NEB) 2.5 mg-0.5 mg/3 ml nebu USE 3 ML VIA NEBULIZER EVERY 4 HOURS AS NEEDED FOR SHORTNESS OF BREATH      lisinopriL (PRINIVIL, ZESTRIL) 10 mg tablet Take 10 mg by mouth daily.  metFORMIN (GLUCOPHAGE) 500 mg tablet Take 500 mg by mouth two (2) times a day.  metoprolol succinate (TOPROL-XL) 50 mg XL tablet Take 50 mg by mouth daily.  montelukast (SINGULAIR) 10 mg tablet Take 10 mg by mouth nightly.  potassium chloride (KLOR-CON) 10 mEq tablet TAKE 1 TABLET BY MOUTH TWICE DAILY WITH MEALS      rizatriptan (MAXALT) 5 mg tablet Take 5 mg by mouth daily as needed.  fexofenadine (ALLEGRA) 180 mg tablet Take  by mouth.  furosemide (Lasix) 40 mg tablet Take  by mouth daily.  gabapentin (NEURONTIN) 300 mg capsule Take 300 mg by mouth three (3) times daily.  acetaminophen (TylenoL) 325 mg tablet Take  by mouth every four (4) hours as needed for Pain.  predniSONE (DELTASONE) 50 mg tablet TAKE 1 TABLET BY MOUTH EVERY DAY FOR 3 DOSES (Patient not taking: Reported on 2021)      promethazine (PHENERGAN) 12.5 mg tablet Take 12.5 mg by mouth.  (Patient not taking: Reported on 10/7/2021)         Allergies   Allergen Reactions    Eucerin [Mineral Oil-Isopropyl Myristat] Hives    Pcn [Penicillins] Hives    Reglan [Metoclopramide] Hives       Social History     Tobacco Use    Smoking status: Former Smoker     Types: Cigarettes     Quit date: 2021     Years since quittin.7    Smokeless tobacco: Never Used   Vaping Use    Vaping Use: Never used   Substance Use Topics    Alcohol use: Yes     Comment: occ    Drug use: Not Currently       Family History   Problem Relation Age of Onset    Heart Disease Mother    Jenn Hypertension Mother     Diabetes Mother     Dementia Mother     Stroke Mother     Heart Disease Father     Heart Failure Father     Kidney Disease Father        Review of Systems:  Positive in BOLD    CONST: Fever, weight loss, fatigue or chills  GI: Nausea, vomiting, abdominal pain, change in bowel habits, hematochezia, melena, and GERD   INTEG: Dermatitis, abnormal moles  HEENT: Recent changes in vision, vertigo, epistaxis, dysphagia and hoarseness  CV: Chest pain, palpitations, HTN, edema and varicosities  RESP: Cough, shortness of breath, wheezing, hemoptysis, snoring and reactive airway disease  : Hematuria, dysuria, frequency, urgency, nocturia and stress urinary incontinence   MS: Weakness, joint pain and arthritis  ENDO: Diabetes, thyroid disease, polyuria, polydipsia, polyphagia, poor wound healing, heat intolerance, cold intolerance  LYMPH/HEME: Anemia, bruising and history of blood transfusions  NEURO: Dizziness, headache, fainting, seizures and stroke  PSYCH: Anxiety and depression    Physical Exam    Visit Vitals  BP (!) 142/91 (BP 1 Location: Left upper arm, BP Patient Position: At rest, BP Cuff Size: Adult)   Pulse (!) 107   Temp 97.3 °F (36.3 °C) (Oral)   Resp 20   Ht 5' 3\" (1.6 m)   Wt 128.8 kg (284 lb)   BMI 50.31 kg/m²         General: Clinically severely obese 40 y.o.) female in no acute distress  Head: Normocephalic, atraumatic  Resp: Clear to auscultation bilaterally, now wheeze, rhonchi, or rales, excursions normal and symmetrical  Cardio: Regular rate and rhythm, no murmurs, clicks, gallops, or rubs. No edema or varicosities. Abdomen: Obese, soft, nontender, nondistended, normoactive bowel sounds, no hernias, no hepatosplenomegaly  Psych: Alert and oriented to person, place, and time. July 2, 2021 CBC, CMP, cholesterol panel, TSH, urinalysis, vitamin B1, B12, folate, iron, vitamin D, H. pylori serology, nicotine: Vitamin D 18.5, cholesterol 221, iron 28, H. pylori positive.   The patient received laboratory profile was begun on vitamin D and iron supplementation and treated for her H. pylori positivity. June 14, 2021 Pap smear: No evidence of malignancy  July 2, 2021 chest x-ray: Atelectasis versus infiltrate  March 10, 2021 bilateral mammography: No evidence of malignancy/BI-RADS 1  September 1, 2021 bariatric nutrition/[place]: Concurrent procedures surgery  September 14, 2021 psychology/[place]: Concurring with pursuit of surgery  July 2, 2021 EKG: Normal sinus rhythm with rate of 81    Impression:    Hugh Conway is a 40 y.o. female with a body mass index of 50 with obesity related comorbidities of hypertension, hypercholesterolemia, gastroesophageal reflux disease, reactive airway disease, clinical obstructive sleep apnea, polycystic ovarian syndrome, carpal tunnel syndrome, and weight related arthropathy who would benefit from bariatric surgery. We've discussed the restrictive and malabsorptive nature of the gastric bypass and compared and contrasted with the sleeve gastrectomy. The patient understands the likelihood of losing approximately 80% of their excess weight in 12 to 18 months. She also understands the risks including but not limited to bleeding, infection, need for reoperation, anastomotic ulcers, leaks and strictures, bowel obstruction secondary to adhesions and internal hernias, DVT, PE, heart attack, stroke, and death. Patient also understands risks of inadequate weight loss, excess weight loss, vitamin insufficiency, protein malnutrition, excess skin, and loss of hair. We have reviewed the components of a successful postoperative course including requirement for a high protein, low carbohydrate diet, 60 oz a day of zero calorie liquids, daily vitamin supplementation, daily exercise, regular follow-up, and participation in support groups.   We have reviewed the preoperative liver shrinking clear liquid diet, as well as reviewed any medication changes required while on the clear liquid diet. In addition, the patient understands that all medications to be taken during the first 8 weeks postoperatively can be taken whole as long as the medication is approximately the size of a Sesar 325 mg aspirin tablet in size. The patient further understands that it is his/her responsibility to review these and verify with their primary care doctor and pharmacist that all medications are of the appropriate size. We will schedule the patient for laparoscopic gastric bypass  in the near future.

## 2021-10-11 ENCOUNTER — HOSPITAL ENCOUNTER (OUTPATIENT)
Dept: LAB | Age: 46
Discharge: HOME | End: 2021-10-11
Payer: OTHER GOVERNMENT

## 2021-10-11 DIAGNOSIS — E66.01 MORBID OBESITY WITH BMI OF 50.0-59.9, ADULT (HCC): Primary | ICD-10-CM

## 2021-10-11 DIAGNOSIS — Z01.818 PRE-OP TESTING: ICD-10-CM

## 2021-10-11 DIAGNOSIS — E66.01 MORBID OBESITY WITH BMI OF 50.0-59.9, ADULT (HCC): ICD-10-CM

## 2021-10-11 LAB
ERYTHROCYTE [DISTWIDTH] IN BLOOD BY AUTOMATED COUNT: 15.8 % (ref 11.6–14.5)
HCT VFR BLD AUTO: 47.7 % (ref 35–45)
HGB BLD-MCNC: 14.4 G/DL (ref 12–16)
MCH RBC QN AUTO: 24.7 PG (ref 24–34)
MCHC RBC AUTO-ENTMCNC: 30.2 G/DL (ref 31–37)
MCV RBC AUTO: 82 FL (ref 78–100)
PLATELET # BLD AUTO: 371 K/UL (ref 135–420)
PMV BLD AUTO: 10.7 FL (ref 9.2–11.8)
RBC # BLD AUTO: 5.82 M/UL (ref 4.2–5.3)
WBC # BLD AUTO: 16.5 K/UL (ref 4.6–13.2)

## 2021-10-11 PROCEDURE — 36415 COLL VENOUS BLD VENIPUNCTURE: CPT

## 2021-10-11 PROCEDURE — 85027 COMPLETE CBC AUTOMATED: CPT

## 2021-11-15 ENCOUNTER — DOCUMENTATION ONLY (OUTPATIENT)
Dept: BARIATRICS/WEIGHT MGMT | Age: 46
End: 2021-11-15

## 2021-11-16 ENCOUNTER — HOSPITAL ENCOUNTER (OUTPATIENT)
Dept: BARIATRICS/WEIGHT MGMT | Age: 46
Discharge: HOME | End: 2021-11-16

## 2021-11-16 RX ORDER — LANOLIN ALCOHOL/MO/W.PET/CERES
325 CREAM (GRAM) TOPICAL DAILY
COMMUNITY
Start: 2021-02-22 | End: 2021-11-23

## 2021-11-16 RX ORDER — ERGOCALCIFEROL 1.25 MG/1
50000 CAPSULE ORAL
COMMUNITY
Start: 2020-12-10 | End: 2021-12-10

## 2021-11-16 RX ORDER — ASCORBIC ACID 500 MG
500 TABLET ORAL DAILY
COMMUNITY
End: 2021-11-23

## 2021-11-16 RX ORDER — CHOLECALCIFEROL (VITAMIN D3) 125 MCG
2000 CAPSULE ORAL DAILY
COMMUNITY

## 2021-11-16 RX ORDER — OMEPRAZOLE 20 MG/1
20 CAPSULE, DELAYED RELEASE ORAL DAILY
COMMUNITY
Start: 2021-07-09 | End: 2021-11-23

## 2021-11-16 RX ORDER — ALBUTEROL SULFATE 90 UG/1
2 AEROSOL, METERED RESPIRATORY (INHALATION)
COMMUNITY

## 2021-11-16 RX ORDER — DEXTROAMPHETAMINE SACCHARATE, AMPHETAMINE ASPARTATE, DEXTROAMPHETAMINE SULFATE AND AMPHETAMINE SULFATE 2.5; 2.5; 2.5; 2.5 MG/1; MG/1; MG/1; MG/1
5 TABLET ORAL 2 TIMES DAILY
COMMUNITY
Start: 2021-08-30

## 2021-11-16 RX ORDER — LOSARTAN POTASSIUM 50 MG/1
50 TABLET ORAL DAILY
COMMUNITY
Start: 2021-11-01

## 2021-11-16 NOTE — PERIOP NOTES
PRE-SURGICAL INSTRUCTIONS        Patient's Name:  Claude Norrie      RCYNX'S Date:  11/16/2021            Covid Testing Date and Time: Vaccinated    Surgery Date:  11/22/2021                1. Do NOT eat or drink anything, including candy, gum, or ice chips after midnight on 11/21, unless you have specific instructions from your surgeon or anesthesia provider to do so.  2. You may brush your teeth before coming to the hospital.  3. No smoking 24 hours prior to the day of surgery. 4. No alcohol 24 hours prior to the day of surgery. 5. No recreational drugs for one week prior to the day of surgery. 6. Leave all valuables, including money/purse, at home. 7. Remove all jewelry, nail polish, acrylic nails, and makeup (including mascara); no lotions powders, deodorant, or perfume/cologne/after shave on the skin. 8. Follow instruction for Hibiclens washes and CHG wipes from surgeon's office. 9. Glasses/contact lenses and dentures may be worn to the hospital.  They will be removed prior to surgery. 10. Call your doctor if symptoms of a cold or illness develop within 24-48 hours prior to your surgery. 11.  If you are having an outpatient procedure, please make arrangements for a responsible ADULT TO [address] [place] [address] and stay with you for 24 hours after your surgery. 12. ONE VISITOR in the hospital at this time for outpatient procedures. Exceptions may be made for surgical admissions, per nursing unit guidelines      Special Instructions:        Bring inhaler. Bring any pertinent legal medical records. Take these medications the morning of surgery with a sip of water:  Use nebulizer if needed. Metoprolol. On the day of surgery, come in the main entrance of [facility]. Let the  at the desk know you are there for surgery. A staff member will come escort you to the surgical area on the second floor.     If you have any questions or concerns, please do not hesitate to call:     (Prior to the day of surgery) PAT department:  [phone]   (Day of surgery) Pre-Op department:  [phone]    [facility] during the PAT phone call.

## 2021-11-16 NOTE — PROGRESS NOTES
CLINICAL NUTRITION PRE-OPERATIVE EDUCATION    Patient's Name: Burak Arrieta   Age: 40 y.o. YOB: 1975   Sex: female    Education & Materials Provided:   - Soft Protein Diet Shopping List  -  Supplemental Resource Guide: MVI, B12, Calcium Citrate, Vitamin D, Vitamin B1,   and iron recommendations  - Protein Supplement Information  - Fluid Requirements/ No Straws  - No Caffeine or Carbonation   - No alcohol              - No Snacks or No Concentrated Sweets     - Exercising   - Support System at [facility] of Support Group meetings. Support System after surgery includes: x     - Key Diet Principles            - Addressed Current Habits/Changes to Make   - Patient has been educated on the liquid diet to begin 1 week prior to surgery. Patient understands the transition of the diet. Attendance of support group: Yes    Summary:  Patient has completed the required amount of visits with the Registered Dietitian. During these nutrition visits, we focused on dietary changes, behavior changes, and the importance of establishing an exercise routine. The diet protocol that patient was prescribed emphasized on low carbohydrates (less than 100 grams per day prior to surgery and 60-80 grams of protein per day). At today's session, patient was educated on the post-op diet protocol. Patient understands the importance of keeping total fat and sugar less than 3 grams per serving. Patient is aware of the transition of the diet stages and is aware that they will be on clear liquids for 7days, followed by soft protein for 5 weeks. Patient understands the body needs ~ 60-70 grams of protein per day. During the liquid phase, patient will need 60 grams of protein from shakes. Once eating soft protein, patient will only need 1 shake per day. Patient is aware of the importance of the vitamins and protein drinks. We spent a lot of time talking about the vitamins.   Patient understands the importance of being compliant with the diet protocol and the complications and risks that can occur if they are non-compliant with the nutritional protocol and non-compliant with the vitamins. Patient has also been educated on the pre-op liquid diet for 1 week. Patient understands that failure to comply in pre-op liquid diet could result in surgery being canceled. Patient's 6 week post-op nutrition appointment has been scheduled. At this 6 week visit, RD will assess how patient is tolerating soft protein and advance to vegetables, if tolerating soft protein without difficulty. Patient will also see RD again at 9 months post-op. This visit will assess patient's compliance with current protocol, including diet, vitamins, protein shakes, and exercise. Post-op diet guidelines will be reinforced. RD is available for questions and to meet with patient outside of the 6 week and 9 month post-op visit. Ok to proceed.      Candidate for surgery: Yes  Re-evaluation Date:     Ngoc Gonzalez 87 RD  11/15/2021

## 2021-11-19 ENCOUNTER — ANESTHESIA EVENT (OUTPATIENT)
Dept: SURGERY | Age: 46
DRG: 621 | End: 2021-11-19
Payer: OTHER GOVERNMENT

## 2021-11-22 ENCOUNTER — HOSPITAL ENCOUNTER (INPATIENT)
Age: 46
LOS: 1 days | Discharge: HOME | DRG: 621 | End: 2021-11-23
Attending: SURGERY | Admitting: SURGERY
Payer: OTHER GOVERNMENT

## 2021-11-22 ENCOUNTER — ANESTHESIA (OUTPATIENT)
Dept: SURGERY | Age: 46
DRG: 621 | End: 2021-11-22
Payer: OTHER GOVERNMENT

## 2021-11-22 DIAGNOSIS — K31.89 GASTRIC MASS: ICD-10-CM

## 2021-11-22 DIAGNOSIS — E66.01 MORBID OBESITY WITH BMI OF 45.0-49.9, ADULT (HCC): ICD-10-CM

## 2021-11-22 DIAGNOSIS — G89.18 POST-OP PAIN: Primary | ICD-10-CM

## 2021-11-22 LAB
GLUCOSE BLD STRIP.AUTO-MCNC: 123 MG/DL (ref 70–110)
GLUCOSE BLD STRIP.AUTO-MCNC: 128 MG/DL (ref 70–110)
GLUCOSE BLD STRIP.AUTO-MCNC: 145 MG/DL (ref 70–110)
GLUCOSE BLD STRIP.AUTO-MCNC: 94 MG/DL (ref 70–110)
HCG UR QL: NEGATIVE

## 2021-11-22 PROCEDURE — 2709999900 HC NON-CHARGEABLE SUPPLY

## 2021-11-22 PROCEDURE — C9290 INJ, BUPIVACAINE LIPOSOME: HCPCS | Performed by: SURGERY

## 2021-11-22 PROCEDURE — 94640 AIRWAY INHALATION TREATMENT: CPT

## 2021-11-22 PROCEDURE — 43645 LAP GASTR BYPASS INCL SMLL I: CPT | Performed by: SURGERY

## 2021-11-22 PROCEDURE — 77030008756 HC TU IRR SUC STRY -B: Performed by: SURGERY

## 2021-11-22 PROCEDURE — 2709999900 HC NON-CHARGEABLE SUPPLY: Performed by: SURGERY

## 2021-11-22 PROCEDURE — 77030040922 HC BLNKT HYPOTHRM STRY -A: Performed by: SURGERY

## 2021-11-22 PROCEDURE — 76010000131 HC OR TIME 2 TO 2.5 HR: Performed by: SURGERY

## 2021-11-22 PROCEDURE — 47379 UNLISTED LAPS PX LIVER: CPT | Performed by: SURGERY

## 2021-11-22 PROCEDURE — 88305 TISSUE EXAM BY PATHOLOGIST: CPT

## 2021-11-22 PROCEDURE — 74011000272 HC RX REV CODE- 272: Performed by: SURGERY

## 2021-11-22 PROCEDURE — 00797 ANES IPER UPR ABD GSTR PX MO: CPT | Performed by: NURSE ANESTHETIST, CERTIFIED REGISTERED

## 2021-11-22 PROCEDURE — 74011000250 HC RX REV CODE- 250: Performed by: SURGERY

## 2021-11-22 PROCEDURE — 65270000029 HC RM PRIVATE

## 2021-11-22 PROCEDURE — 77030036732 HC RELD STPLR VASC J&J -F: Performed by: SURGERY

## 2021-11-22 PROCEDURE — 88307 TISSUE EXAM BY PATHOLOGIST: CPT

## 2021-11-22 PROCEDURE — 77030009968 HC RELD STPLR ENDOSC J&J -D: Performed by: SURGERY

## 2021-11-22 PROCEDURE — 77030018836 HC SOL IRR NACL ICUM -A: Performed by: SURGERY

## 2021-11-22 PROCEDURE — 88309 TISSUE EXAM BY PATHOLOGIST: CPT

## 2021-11-22 PROCEDURE — 74011250636 HC RX REV CODE- 250/636: Performed by: ANESTHESIOLOGY

## 2021-11-22 PROCEDURE — 77030009932 HC PRB FT CTRL J&J -B: Performed by: SURGERY

## 2021-11-22 PROCEDURE — 43659 UNLISTED LAPS PX STOMACH: CPT | Performed by: SURGERY

## 2021-11-22 PROCEDURE — 77030008437 HC REINF STRP REINF SEMGD WLGO -C: Performed by: SURGERY

## 2021-11-22 PROCEDURE — 77030002933 HC SUT MCRYL J&J -A: Performed by: SURGERY

## 2021-11-22 PROCEDURE — 77030002996 HC SUT SLK J&J -A: Performed by: SURGERY

## 2021-11-22 PROCEDURE — 74011250636 HC RX REV CODE- 250/636: Performed by: NURSE ANESTHETIST, CERTIFIED REGISTERED

## 2021-11-22 PROCEDURE — 0D164ZA BYPASS STOMACH TO JEJUNUM, PERCUTANEOUS ENDOSCOPIC APPROACH: ICD-10-PCS | Performed by: SURGERY

## 2021-11-22 PROCEDURE — 88313 SPECIAL STAINS GROUP 2: CPT

## 2021-11-22 PROCEDURE — 77030008598 HC TRCR ENDOSC BLDLS J&J -B: Performed by: SURGERY

## 2021-11-22 PROCEDURE — 77010033678 HC OXYGEN DAILY

## 2021-11-22 PROCEDURE — 74011250636 HC RX REV CODE- 250/636: Performed by: SURGERY

## 2021-11-22 PROCEDURE — 77030008603 HC TRCR ENDOSC EPATH J&J -C: Performed by: SURGERY

## 2021-11-22 PROCEDURE — 77030033639 HC SHR ENDO COAG HARM 36 J&J -E: Performed by: SURGERY

## 2021-11-22 PROCEDURE — 76210000001 HC OR PH I REC 2.5 TO 3 HR: Performed by: SURGERY

## 2021-11-22 PROCEDURE — 74011000250 HC RX REV CODE- 250: Performed by: NURSE ANESTHETIST, CERTIFIED REGISTERED

## 2021-11-22 PROCEDURE — 77030011808 HC STPLR ENDOSCOPIC J&J -D: Performed by: SURGERY

## 2021-11-22 PROCEDURE — 77030009851 HC PCH RTVR ENDOSC AMR -B: Performed by: SURGERY

## 2021-11-22 PROCEDURE — 74011000258 HC RX REV CODE- 258: Performed by: SURGERY

## 2021-11-22 PROCEDURE — 77030040361 HC SLV COMPR DVT MDII -B: Performed by: SURGERY

## 2021-11-22 PROCEDURE — 82962 GLUCOSE BLOOD TEST: CPT

## 2021-11-22 PROCEDURE — 77030027876 HC STPLR ENDOSC FLX PWR J&J -G1: Performed by: SURGERY

## 2021-11-22 PROCEDURE — 74011250637 HC RX REV CODE- 250/637: Performed by: SURGERY

## 2021-11-22 PROCEDURE — 88342 IMHCHEM/IMCYTCHM 1ST ANTB: CPT

## 2021-11-22 PROCEDURE — 77030010031 HC SCIS ENDOSC MPLR J&J -C: Performed by: SURGERY

## 2021-11-22 PROCEDURE — 00797 ANES IPER UPR ABD GSTR PX MO: CPT | Performed by: ANESTHESIOLOGY

## 2021-11-22 PROCEDURE — 77030031139 HC SUT VCRL2 J&J -A: Performed by: SURGERY

## 2021-11-22 PROCEDURE — 76060000035 HC ANESTHESIA 2 TO 2.5 HR: Performed by: SURGERY

## 2021-11-22 PROCEDURE — 81025 URINE PREGNANCY TEST: CPT

## 2021-11-22 PROCEDURE — 0FB24ZX EXCISION OF LEFT LOBE LIVER, PERCUTANEOUS ENDOSCOPIC APPROACH, DIAGNOSTIC: ICD-10-PCS | Performed by: SURGERY

## 2021-11-22 RX ORDER — SODIUM CHLORIDE, SODIUM LACTATE, POTASSIUM CHLORIDE, CALCIUM CHLORIDE 600; 310; 30; 20 MG/100ML; MG/100ML; MG/100ML; MG/100ML
INJECTION, SOLUTION INTRAVENOUS
Status: DISCONTINUED | OUTPATIENT
Start: 2021-11-22 | End: 2021-11-22 | Stop reason: HOSPADM

## 2021-11-22 RX ORDER — SUCCINYLCHOLINE CHLORIDE 20 MG/ML
INJECTION INTRAMUSCULAR; INTRAVENOUS AS NEEDED
Status: DISCONTINUED | OUTPATIENT
Start: 2021-11-22 | End: 2021-11-22 | Stop reason: HOSPADM

## 2021-11-22 RX ORDER — ACETAMINOPHEN 650 MG/1
650 SUPPOSITORY RECTAL ONCE
Status: COMPLETED | OUTPATIENT
Start: 2021-11-22 | End: 2021-11-22

## 2021-11-22 RX ORDER — ONDANSETRON 2 MG/ML
4 INJECTION INTRAMUSCULAR; INTRAVENOUS
Status: DISCONTINUED | OUTPATIENT
Start: 2021-11-22 | End: 2021-11-23 | Stop reason: HOSPADM

## 2021-11-22 RX ORDER — IPRATROPIUM BROMIDE AND ALBUTEROL SULFATE 2.5; .5 MG/3ML; MG/3ML
3 SOLUTION RESPIRATORY (INHALATION)
Status: DISCONTINUED | OUTPATIENT
Start: 2021-11-22 | End: 2021-11-23 | Stop reason: HOSPADM

## 2021-11-22 RX ORDER — PROPOFOL 10 MG/ML
INJECTION, EMULSION INTRAVENOUS AS NEEDED
Status: DISCONTINUED | OUTPATIENT
Start: 2021-11-22 | End: 2021-11-22 | Stop reason: HOSPADM

## 2021-11-22 RX ORDER — CHLORHEXIDINE GLUCONATE 4 G/100ML
SOLUTION TOPICAL AS NEEDED
Status: DISCONTINUED | OUTPATIENT
Start: 2021-11-22 | End: 2021-11-22 | Stop reason: HOSPADM

## 2021-11-22 RX ORDER — SODIUM CHLORIDE, SODIUM LACTATE, POTASSIUM CHLORIDE, CALCIUM CHLORIDE 600; 310; 30; 20 MG/100ML; MG/100ML; MG/100ML; MG/100ML
50 INJECTION, SOLUTION INTRAVENOUS CONTINUOUS
Status: DISCONTINUED | OUTPATIENT
Start: 2021-11-22 | End: 2021-11-22 | Stop reason: HOSPADM

## 2021-11-22 RX ORDER — NEOSTIGMINE METHYLSULFATE 1 MG/ML
INJECTION, SOLUTION INTRAVENOUS AS NEEDED
Status: DISCONTINUED | OUTPATIENT
Start: 2021-11-22 | End: 2021-11-22 | Stop reason: HOSPADM

## 2021-11-22 RX ORDER — FENTANYL CITRATE 50 UG/ML
INJECTION, SOLUTION INTRAMUSCULAR; INTRAVENOUS AS NEEDED
Status: DISCONTINUED | OUTPATIENT
Start: 2021-11-22 | End: 2021-11-22 | Stop reason: HOSPADM

## 2021-11-22 RX ORDER — NALOXONE HYDROCHLORIDE 0.4 MG/ML
0.1 INJECTION, SOLUTION INTRAMUSCULAR; INTRAVENOUS; SUBCUTANEOUS AS NEEDED
Status: DISCONTINUED | OUTPATIENT
Start: 2021-11-22 | End: 2021-11-22 | Stop reason: HOSPADM

## 2021-11-22 RX ORDER — GLYCOPYRROLATE 0.2 MG/ML
INJECTION INTRAMUSCULAR; INTRAVENOUS AS NEEDED
Status: DISCONTINUED | OUTPATIENT
Start: 2021-11-22 | End: 2021-11-22 | Stop reason: HOSPADM

## 2021-11-22 RX ORDER — HYOSCYAMINE SULFATE 0.12 MG/1
0.12 TABLET SUBLINGUAL
Status: DISCONTINUED | OUTPATIENT
Start: 2021-11-22 | End: 2021-11-23 | Stop reason: HOSPADM

## 2021-11-22 RX ORDER — HYDROMORPHONE HYDROCHLORIDE 2 MG/ML
0.5 INJECTION, SOLUTION INTRAMUSCULAR; INTRAVENOUS; SUBCUTANEOUS
Status: DISCONTINUED | OUTPATIENT
Start: 2021-11-22 | End: 2021-11-22 | Stop reason: HOSPADM

## 2021-11-22 RX ORDER — LABETALOL HCL 20 MG/4 ML
SYRINGE (ML) INTRAVENOUS AS NEEDED
Status: DISCONTINUED | OUTPATIENT
Start: 2021-11-22 | End: 2021-11-22 | Stop reason: HOSPADM

## 2021-11-22 RX ORDER — SODIUM CHLORIDE 0.9 % (FLUSH) 0.9 %
5-40 SYRINGE (ML) INJECTION AS NEEDED
Status: DISCONTINUED | OUTPATIENT
Start: 2021-11-22 | End: 2021-11-22 | Stop reason: HOSPADM

## 2021-11-22 RX ORDER — DIPHENHYDRAMINE HYDROCHLORIDE 50 MG/ML
25 INJECTION, SOLUTION INTRAMUSCULAR; INTRAVENOUS
Status: DISCONTINUED | OUTPATIENT
Start: 2021-11-22 | End: 2021-11-23 | Stop reason: HOSPADM

## 2021-11-22 RX ORDER — LIDOCAINE HYDROCHLORIDE 10 MG/ML
0.1 INJECTION, SOLUTION EPIDURAL; INFILTRATION; INTRACAUDAL; PERINEURAL AS NEEDED
Status: DISCONTINUED | OUTPATIENT
Start: 2021-11-22 | End: 2021-11-22 | Stop reason: HOSPADM

## 2021-11-22 RX ORDER — SODIUM CHLORIDE, SODIUM LACTATE, POTASSIUM CHLORIDE, CALCIUM CHLORIDE 600; 310; 30; 20 MG/100ML; MG/100ML; MG/100ML; MG/100ML
25 INJECTION, SOLUTION INTRAVENOUS CONTINUOUS
Status: DISCONTINUED | OUTPATIENT
Start: 2021-11-22 | End: 2021-11-22 | Stop reason: HOSPADM

## 2021-11-22 RX ORDER — ONDANSETRON 2 MG/ML
INJECTION INTRAMUSCULAR; INTRAVENOUS AS NEEDED
Status: DISCONTINUED | OUTPATIENT
Start: 2021-11-22 | End: 2021-11-22 | Stop reason: HOSPADM

## 2021-11-22 RX ORDER — ALBUTEROL SULFATE 0.83 MG/ML
2.5 SOLUTION RESPIRATORY (INHALATION)
Status: DISCONTINUED | OUTPATIENT
Start: 2021-11-22 | End: 2021-11-23 | Stop reason: HOSPADM

## 2021-11-22 RX ORDER — ENOXAPARIN SODIUM 100 MG/ML
40 INJECTION SUBCUTANEOUS EVERY 24 HOURS
Status: DISCONTINUED | OUTPATIENT
Start: 2021-11-22 | End: 2021-11-23 | Stop reason: HOSPADM

## 2021-11-22 RX ORDER — OXYCODONE HYDROCHLORIDE 5 MG/1
5 TABLET ORAL
Status: DISCONTINUED | OUTPATIENT
Start: 2021-11-22 | End: 2021-11-23 | Stop reason: HOSPADM

## 2021-11-22 RX ORDER — ONDANSETRON 2 MG/ML
4 INJECTION INTRAMUSCULAR; INTRAVENOUS ONCE
Status: DISCONTINUED | OUTPATIENT
Start: 2021-11-22 | End: 2021-11-22 | Stop reason: HOSPADM

## 2021-11-22 RX ORDER — FAMOTIDINE 20 MG/1
20 TABLET, FILM COATED ORAL ONCE
Status: DISCONTINUED | OUTPATIENT
Start: 2021-11-22 | End: 2021-11-22 | Stop reason: HOSPADM

## 2021-11-22 RX ORDER — DEXTROSE 50 % IN WATER (D50W) INTRAVENOUS SYRINGE
25-50 AS NEEDED
Status: DISCONTINUED | OUTPATIENT
Start: 2021-11-22 | End: 2021-11-23 | Stop reason: HOSPADM

## 2021-11-22 RX ORDER — MAGNESIUM SULFATE 100 %
4 CRYSTALS MISCELLANEOUS AS NEEDED
Status: DISCONTINUED | OUTPATIENT
Start: 2021-11-22 | End: 2021-11-22 | Stop reason: HOSPADM

## 2021-11-22 RX ORDER — INSULIN LISPRO 100 [IU]/ML
INJECTION, SOLUTION INTRAVENOUS; SUBCUTANEOUS ONCE
Status: DISCONTINUED | OUTPATIENT
Start: 2021-11-22 | End: 2021-11-22 | Stop reason: HOSPADM

## 2021-11-22 RX ORDER — SODIUM CHLORIDE, SODIUM LACTATE, POTASSIUM CHLORIDE, CALCIUM CHLORIDE 600; 310; 30; 20 MG/100ML; MG/100ML; MG/100ML; MG/100ML
150 INJECTION, SOLUTION INTRAVENOUS CONTINUOUS
Status: DISCONTINUED | OUTPATIENT
Start: 2021-11-22 | End: 2021-11-22 | Stop reason: HOSPADM

## 2021-11-22 RX ORDER — MIDAZOLAM HYDROCHLORIDE 1 MG/ML
INJECTION, SOLUTION INTRAMUSCULAR; INTRAVENOUS AS NEEDED
Status: DISCONTINUED | OUTPATIENT
Start: 2021-11-22 | End: 2021-11-22 | Stop reason: HOSPADM

## 2021-11-22 RX ORDER — RIZATRIPTAN BENZOATE 5 MG/1
5 TABLET, ORALLY DISINTEGRATING ORAL DAILY PRN
Status: DISCONTINUED | OUTPATIENT
Start: 2021-11-22 | End: 2021-11-23 | Stop reason: HOSPADM

## 2021-11-22 RX ORDER — LABETALOL HCL 20 MG/4 ML
5 SYRINGE (ML) INTRAVENOUS
Status: COMPLETED | OUTPATIENT
Start: 2021-11-22 | End: 2021-11-22

## 2021-11-22 RX ORDER — ALBUTEROL SULFATE 0.83 MG/ML
2.5 SOLUTION RESPIRATORY (INHALATION)
Status: DISCONTINUED | OUTPATIENT
Start: 2021-11-22 | End: 2021-11-22

## 2021-11-22 RX ORDER — SODIUM CHLORIDE 0.9 % (FLUSH) 0.9 %
5-40 SYRINGE (ML) INJECTION EVERY 8 HOURS
Status: DISCONTINUED | OUTPATIENT
Start: 2021-11-22 | End: 2021-11-22 | Stop reason: HOSPADM

## 2021-11-22 RX ORDER — RIZATRIPTAN BENZOATE 5 MG/1
5 TABLET ORAL DAILY PRN
Status: DISCONTINUED | OUTPATIENT
Start: 2021-11-22 | End: 2021-11-22 | Stop reason: CLARIF

## 2021-11-22 RX ORDER — ALBUTEROL SULFATE 90 UG/1
2 AEROSOL, METERED RESPIRATORY (INHALATION)
Status: DISCONTINUED | OUTPATIENT
Start: 2021-11-22 | End: 2021-11-22 | Stop reason: CLARIF

## 2021-11-22 RX ORDER — NALOXONE HYDROCHLORIDE 0.4 MG/ML
0.4 INJECTION, SOLUTION INTRAMUSCULAR; INTRAVENOUS; SUBCUTANEOUS AS NEEDED
Status: DISCONTINUED | OUTPATIENT
Start: 2021-11-22 | End: 2021-11-23 | Stop reason: HOSPADM

## 2021-11-22 RX ORDER — HYDROMORPHONE HYDROCHLORIDE 1 MG/ML
1 INJECTION, SOLUTION INTRAMUSCULAR; INTRAVENOUS; SUBCUTANEOUS
Status: DISCONTINUED | OUTPATIENT
Start: 2021-11-22 | End: 2021-11-23 | Stop reason: HOSPADM

## 2021-11-22 RX ORDER — HYDROMORPHONE HYDROCHLORIDE 2 MG/ML
0.2 INJECTION, SOLUTION INTRAMUSCULAR; INTRAVENOUS; SUBCUTANEOUS AS NEEDED
Status: DISCONTINUED | OUTPATIENT
Start: 2021-11-22 | End: 2021-11-22 | Stop reason: HOSPADM

## 2021-11-22 RX ORDER — ROCURONIUM BROMIDE 10 MG/ML
INJECTION, SOLUTION INTRAVENOUS AS NEEDED
Status: DISCONTINUED | OUTPATIENT
Start: 2021-11-22 | End: 2021-11-22 | Stop reason: HOSPADM

## 2021-11-22 RX ORDER — DEXTROSE 50 % IN WATER (D50W) INTRAVENOUS SYRINGE
25-50 AS NEEDED
Status: DISCONTINUED | OUTPATIENT
Start: 2021-11-22 | End: 2021-11-22 | Stop reason: HOSPADM

## 2021-11-22 RX ORDER — WATER 1 ML/ML
IRRIGANT IRRIGATION AS NEEDED
Status: DISCONTINUED | OUTPATIENT
Start: 2021-11-22 | End: 2021-11-22 | Stop reason: HOSPADM

## 2021-11-22 RX ORDER — INSULIN LISPRO 100 [IU]/ML
INJECTION, SOLUTION INTRAVENOUS; SUBCUTANEOUS EVERY 6 HOURS
Status: DISCONTINUED | OUTPATIENT
Start: 2021-11-22 | End: 2021-11-23 | Stop reason: HOSPADM

## 2021-11-22 RX ORDER — ACETAMINOPHEN 325 MG/1
650 TABLET ORAL EVERY 6 HOURS
Status: DISCONTINUED | OUTPATIENT
Start: 2021-11-22 | End: 2021-11-23 | Stop reason: HOSPADM

## 2021-11-22 RX ORDER — DIPHENHYDRAMINE HYDROCHLORIDE 50 MG/ML
12.5 INJECTION, SOLUTION INTRAMUSCULAR; INTRAVENOUS
Status: DISCONTINUED | OUTPATIENT
Start: 2021-11-22 | End: 2021-11-22 | Stop reason: HOSPADM

## 2021-11-22 RX ORDER — HYDROMORPHONE HYDROCHLORIDE 2 MG/ML
INJECTION, SOLUTION INTRAMUSCULAR; INTRAVENOUS; SUBCUTANEOUS AS NEEDED
Status: DISCONTINUED | OUTPATIENT
Start: 2021-11-22 | End: 2021-11-22 | Stop reason: HOSPADM

## 2021-11-22 RX ORDER — SODIUM CHLORIDE, SODIUM LACTATE, POTASSIUM CHLORIDE, CALCIUM CHLORIDE 600; 310; 30; 20 MG/100ML; MG/100ML; MG/100ML; MG/100ML
150 INJECTION, SOLUTION INTRAVENOUS CONTINUOUS
Status: DISCONTINUED | OUTPATIENT
Start: 2021-11-22 | End: 2021-11-23 | Stop reason: HOSPADM

## 2021-11-22 RX ORDER — LIDOCAINE HYDROCHLORIDE 20 MG/ML
INJECTION, SOLUTION EPIDURAL; INFILTRATION; INTRACAUDAL; PERINEURAL AS NEEDED
Status: DISCONTINUED | OUTPATIENT
Start: 2021-11-22 | End: 2021-11-22 | Stop reason: HOSPADM

## 2021-11-22 RX ORDER — ENOXAPARIN SODIUM 100 MG/ML
40 INJECTION SUBCUTANEOUS ONCE
Status: COMPLETED | OUTPATIENT
Start: 2021-11-22 | End: 2021-11-22

## 2021-11-22 RX ADMIN — OXYCODONE HYDROCHLORIDE 5 MG: 5 TABLET ORAL at 23:09

## 2021-11-22 RX ADMIN — LABETALOL HYDROCHLORIDE 5 MG: 5 INJECTION, SOLUTION INTRAVENOUS at 12:09

## 2021-11-22 RX ADMIN — HYDROMORPHONE HYDROCHLORIDE 1.5 MG: 2 INJECTION, SOLUTION INTRAMUSCULAR; INTRAVENOUS; SUBCUTANEOUS at 08:07

## 2021-11-22 RX ADMIN — FENTANYL CITRATE 50 MCG: 50 INJECTION, SOLUTION INTRAMUSCULAR; INTRAVENOUS at 07:34

## 2021-11-22 RX ADMIN — PHENYLEPHRINE HYDROCHLORIDE 50 MCG: 10 INJECTION INTRAVENOUS at 07:58

## 2021-11-22 RX ADMIN — HYDROMORPHONE HYDROCHLORIDE 0.5 MG: 2 INJECTION, SOLUTION INTRAMUSCULAR; INTRAVENOUS; SUBCUTANEOUS at 11:03

## 2021-11-22 RX ADMIN — CLINDAMYCIN 900 MG: 150 INJECTION, SOLUTION INTRAMUSCULAR; INTRAVENOUS at 07:51

## 2021-11-22 RX ADMIN — LABETALOL HYDROCHLORIDE 5 MG: 5 INJECTION, SOLUTION INTRAVENOUS at 11:50

## 2021-11-22 RX ADMIN — FAMOTIDINE 20 MG: 10 INJECTION INTRAVENOUS at 06:57

## 2021-11-22 RX ADMIN — HYOSCYAMINE SULFATE 0.12 MG: 0.12 TABLET ORAL; SUBLINGUAL at 23:09

## 2021-11-22 RX ADMIN — HYDROMORPHONE HYDROCHLORIDE 0.5 MG: 2 INJECTION, SOLUTION INTRAMUSCULAR; INTRAVENOUS; SUBCUTANEOUS at 08:04

## 2021-11-22 RX ADMIN — HYDROMORPHONE HYDROCHLORIDE 0.5 MG: 2 INJECTION, SOLUTION INTRAMUSCULAR; INTRAVENOUS; SUBCUTANEOUS at 12:00

## 2021-11-22 RX ADMIN — LABETALOL 20 MG/4 ML (5 MG/ML) INTRAVENOUS SYRINGE 5 MG: at 08:49

## 2021-11-22 RX ADMIN — LABETALOL 20 MG/4 ML (5 MG/ML) INTRAVENOUS SYRINGE 5 MG: at 09:49

## 2021-11-22 RX ADMIN — ENOXAPARIN SODIUM 40 MG: 100 INJECTION SUBCUTANEOUS at 06:57

## 2021-11-22 RX ADMIN — ALBUTEROL SULFATE 2.5 MG: 2.5 SOLUTION RESPIRATORY (INHALATION) at 19:57

## 2021-11-22 RX ADMIN — FENTANYL CITRATE 50 MCG: 50 INJECTION, SOLUTION INTRAMUSCULAR; INTRAVENOUS at 07:39

## 2021-11-22 RX ADMIN — LABETALOL HYDROCHLORIDE 5 MG: 5 INJECTION, SOLUTION INTRAVENOUS at 18:01

## 2021-11-22 RX ADMIN — HYOSCYAMINE SULFATE 0.12 MG: 0.12 TABLET ORAL; SUBLINGUAL at 13:05

## 2021-11-22 RX ADMIN — ACETAMINOPHEN 650 MG: 325 TABLET ORAL at 13:05

## 2021-11-22 RX ADMIN — OXYCODONE HYDROCHLORIDE 5 MG: 5 TABLET ORAL at 14:00

## 2021-11-22 RX ADMIN — HYDROMORPHONE HYDROCHLORIDE 0.5 MG: 2 INJECTION, SOLUTION INTRAMUSCULAR; INTRAVENOUS; SUBCUTANEOUS at 11:11

## 2021-11-22 RX ADMIN — SUCCINYLCHOLINE CHLORIDE 160 MG: 20 INJECTION, SOLUTION INTRAMUSCULAR; INTRAVENOUS at 07:44

## 2021-11-22 RX ADMIN — SODIUM CHLORIDE, SODIUM LACTATE, POTASSIUM CHLORIDE, AND CALCIUM CHLORIDE 150 ML/HR: 600; 310; 30; 20 INJECTION, SOLUTION INTRAVENOUS at 13:00

## 2021-11-22 RX ADMIN — ROCURONIUM BROMIDE 45 MG: 50 INJECTION INTRAVENOUS at 07:54

## 2021-11-22 RX ADMIN — PROPOFOL 150 MG: 10 INJECTION, EMULSION INTRAVENOUS at 07:44

## 2021-11-22 RX ADMIN — ONDANSETRON 4 MG: 2 INJECTION INTRAMUSCULAR; INTRAVENOUS at 18:53

## 2021-11-22 RX ADMIN — SODIUM CHLORIDE, SODIUM LACTATE, POTASSIUM CHLORIDE, AND CALCIUM CHLORIDE 25 ML/HR: 600; 310; 30; 20 INJECTION, SOLUTION INTRAVENOUS at 06:57

## 2021-11-22 RX ADMIN — Medication 3 MG: at 09:49

## 2021-11-22 RX ADMIN — ROCURONIUM BROMIDE 5 MG: 50 INJECTION INTRAVENOUS at 07:44

## 2021-11-22 RX ADMIN — ONDANSETRON 4 MG: 2 INJECTION INTRAMUSCULAR; INTRAVENOUS at 09:49

## 2021-11-22 RX ADMIN — SODIUM CHLORIDE, SODIUM LACTATE, POTASSIUM CHLORIDE, AND CALCIUM CHLORIDE: 600; 310; 30; 20 INJECTION, SOLUTION INTRAVENOUS at 09:49

## 2021-11-22 RX ADMIN — GLYCOPYRROLATE 0.1 MG: 0.2 INJECTION INTRAMUSCULAR; INTRAVENOUS at 07:53

## 2021-11-22 RX ADMIN — ACETAMINOPHEN 650 MG: 325 TABLET ORAL at 18:06

## 2021-11-22 RX ADMIN — ALBUTEROL SULFATE 2.5 MG: 2.5 SOLUTION RESPIRATORY (INHALATION) at 15:59

## 2021-11-22 RX ADMIN — GLYCOPYRROLATE 0.4 MG: 0.2 INJECTION INTRAMUSCULAR; INTRAVENOUS at 09:49

## 2021-11-22 RX ADMIN — LABETALOL 20 MG/4 ML (5 MG/ML) INTRAVENOUS SYRINGE 5 MG: at 08:45

## 2021-11-22 RX ADMIN — FENTANYL CITRATE 100 MCG: 50 INJECTION, SOLUTION INTRAMUSCULAR; INTRAVENOUS at 08:14

## 2021-11-22 RX ADMIN — LIDOCAINE HYDROCHLORIDE 100 MG: 20 INJECTION, SOLUTION EPIDURAL; INFILTRATION; INTRACAUDAL; PERINEURAL at 07:44

## 2021-11-22 RX ADMIN — MIDAZOLAM HYDROCHLORIDE 2 MG: 2 INJECTION, SOLUTION INTRAMUSCULAR; INTRAVENOUS at 07:34

## 2021-11-22 RX ADMIN — SODIUM CHLORIDE, SODIUM LACTATE, POTASSIUM CHLORIDE, AND CALCIUM CHLORIDE: 600; 310; 30; 20 INJECTION, SOLUTION INTRAVENOUS at 07:34

## 2021-11-22 NOTE — ANESTHESIA POSTPROCEDURE EVALUATION
Procedure(s):  LAPAROSCOPIC DIANDRA-EN-Y GASTRIC BYPASS WITH LIVER WEDGE BIOSY, Lysis of Adhesions and section of gastric mass excision. general    Anesthesia Post Evaluation      Multimodal analgesia: multimodal analgesia used between 6 hours prior to anesthesia start to PACU discharge  Patient location during evaluation: PACU  Patient participation: complete - patient participated  Level of consciousness: awake and alert  Pain management: adequate  Airway patency: patent  Anesthetic complications: no  Cardiovascular status: acceptable  Respiratory status: acceptable  Hydration status: acceptable  Post anesthesia nausea and vomiting:  controlled  Final Post Anesthesia Temperature Assessment:  Normothermia (36.0-37.5 degrees C)      INITIAL Post-op Vital signs:   Vitals Value Taken Time   /95 11/22/21 1231   Temp 36.5 °C (97.7 °F) 11/22/21 1006   Pulse 66 11/22/21 1234   Resp 18 11/22/21 1234   SpO2 96 % 11/22/21 1221   Vitals shown include unvalidated device data.

## 2021-11-22 NOTE — PROGRESS NOTES
Problem: Falls - Risk of  Goal: *Absence of Falls  Description: Document Brittaney Arnold Fall Risk and appropriate interventions in the flowsheet.   Outcome: Progressing Towards Goal  Note: Fall Risk Interventions:                                Problem: Patient Education: Go to Patient Education Activity  Goal: Patient/Family Education  Outcome: Progressing Towards Goal     Problem: Pain  Goal: *Control of Pain  Outcome: Progressing Towards Goal

## 2021-11-22 NOTE — ROUTINE PROCESS
TRANSFER - IN REPORT:    Verbal report received from Julia Miner RN(name) on Irl Singh  being received from PACU (unit) for routine post - op      Report consisted of patients Situation, Background, Assessment and   Recommendations(SBAR). Information from the following report(s) SBAR, Kardex, Procedure Summary, Intake/Output and MAR was reviewed with the receiving nurse. Opportunity for questions and clarification was provided. Assessment completed upon patients arrival to unit and care assumed.

## 2021-11-22 NOTE — PROGRESS NOTES
conducted a pre-surgery visit with Jacqueline Claudio, who is a 40 y.o.,female. The  provided the following Interventions:  Initiated a relationship of care and support. Offered prayer and assurance of continued prayers on patient's behalf. There is no advance directive for this patient. Plan:  Chaplains will continue to follow and will provide pastoral care on an as needed/requested basis.  recommends bedside caregivers page  on duty if patient shows signs of acute spiritual or emotional distress.   Reiseñor 3   Board Certified [address] [place] [address]   [phone]

## 2021-11-22 NOTE — ANESTHESIA PREPROCEDURE EVALUATION
Relevant Problems   No relevant active problems       Anesthetic History   No history of anesthetic complications            Review of Systems / Medical History  Patient summary reviewed and pertinent labs reviewed    Pulmonary            Asthma        Neuro/Psych   Within defined limits           Cardiovascular    Hypertension                   GI/Hepatic/Renal           PUD     Endo/Other    Diabetes: well controlled, type 2    Morbid obesity     Other Findings              Physical Exam    Airway  Mallampati: II  TM Distance: 4 - 6 cm  Neck ROM: normal range of motion   Mouth opening: Normal     Cardiovascular  Regular rate and rhythm,  S1 and S2 normal,  no murmur, click, rub, or gallop             Dental  No notable dental hx       Pulmonary  Breath sounds clear to auscultation               Abdominal  GI exam deferred       Other Findings            Anesthetic Plan    ASA: 3  Anesthesia type: general          Induction: Intravenous  Anesthetic plan and risks discussed with: Patient

## 2021-11-22 NOTE — H&P
Office Visit    10/7/2021  [facility]   Jose Joseph DO    General Surgery  Morbid obesity with BMI of 50.0-59.9, adult [facility])    Dx  Morbid Obesity ; Referred by None    Reason for Visit       Progress Notes  Jose Joseph DO (Physician) Bella Bender General Surgery     Preop History and Physical Exam:     Charmaine Macdonald is a 40 y.o. female who was initially evaluated in this office on May 20, 2021 for discussion surgical options available for the definitive management of her super obesity. The patient that time weighed 306 pounds on a 5 foot 3 inch frame with a body mass index of 54 with obesity related comorbidities of hypertension, hypercholesterolemia, gastroesophageal reflux disease, reactive airway disease, clinical obstructive sleep apnea, polycystic ovarian syndrome, post carpal tunnel syndrome, and weight related arthropathy. The patient after discussing surgical options of L4 the definitive weight loss is like to pursue laparoscopic potentially open Scott-en-Y gastric bypass to achieve definitive durable weight loss on a personal level expected resolution of obesity related comorbidities. The patient returns today after completing all multidisciplinary bariatric surgical programmatic requirements necessary prior to pursuit of surgery for the discussion of the diagnostic evaluation and surgical scheduling noting no new medical or surgical history. Patient currently weighs 284 pounds and a 5 feet 3 inches frame with a body mass index of 50 with obesity related comorbidities of hypertension, hypercholesterolemia, gastroesophageal reflux disease, reactive airway disease, clinical obstructive sleep apnea, polycystic ovarian syndrome, bilateral carpal tunnel syndrome, weight related arthropathy.   Ideal body weight 120 pounds, excess body weight 156 pounds, estimated postsurgical weight loss based on 8% loss of excess body weight 125 pounds, postsurgical goal weight 160 pounds          Past Medical History:   Diagnosis Date    Asthma      Diabetes ([address]      Hypertension      Multiple gastric ulcers      PCOS (polycystic ovarian syndrome)                 Past Surgical History:   Procedure Laterality Date    HX CARPAL TUNNEL RELEASE Bilateral 2017    HX CHOLECYSTECTOMY   2000    HX GI        LITHOTRIPSY   2013                Current Outpatient Medications   Medication Sig Dispense Refill    escitalopram oxalate (Lexapro) 10 mg tablet Take 10 mg by mouth daily.        Combivent Respimat  mcg/actuation inhaler          albuterol-ipratropium (DUO-NEB) 2.5 mg-0.5 mg/3 ml nebu USE 3 ML VIA NEBULIZER EVERY 4 HOURS AS NEEDED FOR SHORTNESS OF BREATH        lisinopriL (PRINIVIL, ZESTRIL) 10 mg tablet Take 10 mg by mouth daily.        metFORMIN (GLUCOPHAGE) 500 mg tablet Take 500 mg by mouth two (2) times a day.        metoprolol succinate (TOPROL-XL) 50 mg XL tablet Take 50 mg by mouth daily.        montelukast (SINGULAIR) 10 mg tablet Take 10 mg by mouth nightly.        potassium chloride (KLOR-CON) 10 mEq tablet TAKE 1 TABLET BY MOUTH TWICE DAILY WITH MEALS        rizatriptan (MAXALT) 5 mg tablet Take 5 mg by mouth daily as needed.        fexofenadine (ALLEGRA) 180 mg tablet Take  by mouth.        furosemide (Lasix) 40 mg tablet Take  by mouth daily.        gabapentin (NEURONTIN) 300 mg capsule Take 300 mg by mouth three (3) times daily.        acetaminophen (TylenoL) 325 mg tablet Take  by mouth every four (4) hours as needed for Pain.        predniSONE (DELTASONE) 50 mg tablet TAKE 1 TABLET BY MOUTH EVERY DAY FOR 3 DOSES (Patient not taking: Reported on 5/20/2021)        promethazine (PHENERGAN) 12.5 mg tablet Take 12.5 mg by mouth.  (Patient not taking: Reported on 10/7/2021)                  Allergies   Allergen Reactions    Eucerin [Mineral Oil-Isopropyl Myristat] Hives    Pcn [Penicillins] Hives    Reglan [Metoclopramide] Hives         Social History          Tobacco Use    Smoking status: Former Smoker       Types: Cigarettes       Quit date: 2021       Years since quittin.7    Smokeless tobacco: Never Used   Vaping Use    Vaping Use: Never used   Substance Use Topics    Alcohol use:  Yes       Comment: occ    Drug use: Not Currently         Family History   Problem Relation Age of Onset    Heart Disease Mother      Hypertension Mother      Diabetes Mother      Dementia Mother      Stroke Mother      Heart Disease Father      Heart Failure Father      Kidney Disease Father           Review of Systems:  Positive in BOLD     CONST: Fever, weight loss, fatigue or chills  GI: Nausea, vomiting, abdominal pain, change in bowel habits, hematochezia, melena, and GERD   INTEG: Dermatitis, abnormal moles  HEENT: Recent changes in vision, vertigo, epistaxis, dysphagia and hoarseness  CV: Chest pain, palpitations, HTN, edema and varicosities  RESP: Cough, shortness of breath, wheezing, hemoptysis, snoring and reactive airway disease  : Hematuria, dysuria, frequency, urgency, nocturia and stress urinary incontinence   MS: Weakness, joint pain and arthritis  ENDO: Diabetes, thyroid disease, polyuria, polydipsia, polyphagia, poor wound healing, heat intolerance, cold intolerance  LYMPH/HEME: Anemia, bruising and history of blood transfusions  NEURO: Dizziness, headache, fainting, seizures and stroke  PSYCH: Anxiety and depression     Physical Exam     Visit Vitals  BP (!) 142/91 (BP 1 Location: Left upper arm, BP Patient Position: At rest, BP Cuff Size: Adult)   Pulse (!) 107   Temp 97.3 °F (36.3 °C) (Oral)   Resp 20   Ht 5' 3\" (1.6 m)   Wt 128.8 kg (284 lb)   BMI 50.31 kg/m²            General: Clinically severely obese 40 y.o.) female in no acute distress  Head: Normocephalic, atraumatic  Resp: Clear to auscultation bilaterally, now wheeze, rhonchi, or rales, excursions normal and symmetrical  Cardio: Regular rate and rhythm, no murmurs, clicks, gallops, or rubs. No edema or varicosities. Abdomen: Obese, soft, nontender, nondistended, normoactive bowel sounds, no hernias, no hepatosplenomegaly  Psych: Alert and oriented to person, place, and time.     July 2, 2021 CBC, CMP, cholesterol panel, TSH, urinalysis, vitamin B1, B12, folate, iron, vitamin D, H. pylori serology, nicotine: Vitamin D 18.5, cholesterol 221, iron 28, H. pylori positive. The patient received laboratory profile was begun on vitamin D and iron supplementation and treated for her H. pylori positivity. June 14, 2021 Pap smear: No evidence of malignancy  July 2, 2021 chest x-ray: Atelectasis versus infiltrate  March 10, 2021 bilateral mammography: No evidence of malignancy/BI-RADS 1  September 1, 2021 bariatric nutrition/[place]: Concurrent procedures surgery  September 14, 2021 psychology/[place]: Concurring with pursuit of surgery  July 2, 2021 EKG: Normal sinus rhythm with rate of 81     Impression:     Jun Pan is a 40 y.o. female with a body mass index of 50 with obesity related comorbidities of hypertension, hypercholesterolemia, gastroesophageal reflux disease, reactive airway disease, clinical obstructive sleep apnea, polycystic ovarian syndrome, carpal tunnel syndrome, and weight related arthropathy who would benefit from bariatric surgery. We've discussed the restrictive and malabsorptive nature of the gastric bypass and compared and contrasted with the sleeve gastrectomy. The patient understands the likelihood of losing approximately 80% of their excess weight in 12 to 18 months. She also understands the risks including but not limited to bleeding, infection, need for reoperation, anastomotic ulcers, leaks and strictures, bowel obstruction secondary to adhesions and internal hernias, DVT, PE, heart attack, stroke, and death.  Patient also understands risks of inadequate weight loss, excess weight loss, vitamin insufficiency, protein malnutrition, excess skin, and loss of hair. We have reviewed the components of a successful postoperative course including requirement for a high protein, low carbohydrate diet, 60 oz a day of zero calorie liquids, daily vitamin supplementation, daily exercise, regular follow-up, and participation in support groups. We have reviewed the preoperative liver shrinking clear liquid diet, as well as reviewed any medication changes required while on the clear liquid diet. In addition, the patient understands that all medications to be taken during the first 8 weeks postoperatively can be taken whole as long as the medication is approximately the size of a Sesar 325 mg aspirin tablet in size. The patient further understands that it is his/her responsibility to review these and verify with their primary care doctor and pharmacist that all medications are of the appropriate size. We will schedule the patient for laparoscopic gastric bypass  in the near future. The above history and physical examination was reviewed. There is been no interval medical or surgical history. The post laparoscopic potentially open Scott-en-Y gastric bypass was reviewed. The risk benefits of operating up to including death which the patient voiced understanding of and wished to proceed.   Silverio Smith, FACS

## 2021-11-22 NOTE — PERIOP NOTES
TRANSFER - OUT REPORT:    Verbal report given to [facility] ROSHAN MASSEY (name) on Tacho Cobb  being transferred to [address] [place] [address](unit) for routine post - op       Report consisted of patients Situation, Background, Assessment and   Recommendations(SBAR). Information from the following report(s) SBAR, Kardex, Procedure Summary, Intake/Output and MAR was reviewed with the receiving nurse. Lines:   Peripheral IV 11/22/21 Left Hand (Active)   Site Assessment Clean, dry, & intact 11/22/21 1011   Phlebitis Assessment 0 11/22/21 1011   Infiltration Assessment 0 11/22/21 1011   Dressing Status Clean, dry, & intact 11/22/21 1011   Dressing Type Tape; Transparent 11/22/21 1011   Hub Color/Line Status Pink; Infusing 11/22/21 1011        Opportunity for questions and clarification was provided.       Patient transported with:   O2 @ 2 liters  Tech

## 2021-11-22 NOTE — BRIEF OP NOTE
Brief Postoperative Note    Patient: Toribio López  YOB: 1975  MRN: [id number]    Date of Procedure: 11/22/2021     Pre-Op Diagnosis: Morbid obesity ([address] [E66.01]  Essential hypertension [I10]    Post-Op Diagnosis:     1. Intra-abdominal adhesive disease  2. Anterior body 3 cm gastric mass  Clinically severe obesity with body mass index of 50  4. Hepatic steatosis    Procedure(s):  1. Laparoscopic lysis of adhesions  2. Laparoscopic gastric wedge for resection anterior gastric body 3 cm mass  3. Laparoscopic Scott-en-Y gastric bypass utilizing a 50 cc separate tubularize gastric based on the circumference stomach, a 55 cm Scott limb, 40 cm biliopancreatic limb  4.   Laparoscopic left pedicle wedge biopsy    Surgeon(s):  Franca Bullock DO    Surgical Assistant: Surg Asst-1: Melita Eddy    Anesthesia: General     Estimated Blood Loss (mL): Minimal    Complications: None    Specimens:   ID Type Source Tests Collected by Time Destination   1 : Liver Biopsy  Preservative   Franca Bullock DO 11/22/2021 9535 Pathology   2 : Gastric Mass Preservative   Franca Bullock DO 11/22/2021 0855 Pathology        Implants: * No implants in log *    Drains: * No LDAs found *    Findings: Intra-abdominal adhesive disease secondary to previous surgical procedures, hepatomegaly with morphologic appearance hepatic steatosis, 3 cm gastric mass base in the anterior portion body of the stomach    Electronically Signed by Jethro Walker DO on 11/22/2021 at 10:26 AM

## 2021-11-23 VITALS
DIASTOLIC BLOOD PRESSURE: 79 MMHG | SYSTOLIC BLOOD PRESSURE: 177 MMHG | WEIGHT: 282 LBS | HEART RATE: 82 BPM | OXYGEN SATURATION: 92 % | BODY MASS INDEX: 49.96 KG/M2 | TEMPERATURE: 98.6 F | RESPIRATION RATE: 17 BRPM | HEIGHT: 63 IN

## 2021-11-23 LAB
ANION GAP SERPL CALC-SCNC: 5 MMOL/L (ref 3–18)
BUN SERPL-MCNC: 13 MG/DL (ref 7–18)
BUN/CREAT SERPL: 16 (ref 12–20)
CALCIUM SERPL-MCNC: 8.5 MG/DL (ref 8.5–10.1)
CHLORIDE SERPL-SCNC: 106 MMOL/L (ref 100–111)
CO2 SERPL-SCNC: 26 MMOL/L (ref 21–32)
CREAT SERPL-MCNC: 0.82 MG/DL (ref 0.6–1.3)
ERYTHROCYTE [DISTWIDTH] IN BLOOD BY AUTOMATED COUNT: 14 % (ref 11.6–14.5)
EST. AVERAGE GLUCOSE BLD GHB EST-MCNC: 117 MG/DL
GLUCOSE BLD STRIP.AUTO-MCNC: 114 MG/DL (ref 70–110)
GLUCOSE BLD STRIP.AUTO-MCNC: 116 MG/DL (ref 70–110)
GLUCOSE SERPL-MCNC: 115 MG/DL (ref 74–99)
HBA1C MFR BLD: 5.7 % (ref 4.2–5.6)
HCT VFR BLD AUTO: 43.3 % (ref 35–45)
HGB BLD-MCNC: 13.4 G/DL (ref 12–16)
MAGNESIUM SERPL-MCNC: 2.2 MG/DL (ref 1.6–2.6)
MCH RBC QN AUTO: 25.8 PG (ref 24–34)
MCHC RBC AUTO-ENTMCNC: 30.9 G/DL (ref 31–37)
MCV RBC AUTO: 83.4 FL (ref 78–100)
NRBC # BLD: 0 K/UL (ref 0–0.01)
NRBC BLD-RTO: 0 PER 100 WBC
PLATELET # BLD AUTO: 332 K/UL (ref 135–420)
PMV BLD AUTO: 10.4 FL (ref 9.2–11.8)
POTASSIUM SERPL-SCNC: 4.3 MMOL/L (ref 3.5–5.5)
RBC # BLD AUTO: 5.19 M/UL (ref 4.2–5.3)
SODIUM SERPL-SCNC: 137 MMOL/L (ref 136–145)
WBC # BLD AUTO: 15 K/UL (ref 4.6–13.2)

## 2021-11-23 PROCEDURE — 80048 BASIC METABOLIC PNL TOTAL CA: CPT

## 2021-11-23 PROCEDURE — 2709999900 HC NON-CHARGEABLE SUPPLY

## 2021-11-23 PROCEDURE — 82962 GLUCOSE BLOOD TEST: CPT

## 2021-11-23 PROCEDURE — 74011250637 HC RX REV CODE- 250/637: Performed by: SURGERY

## 2021-11-23 PROCEDURE — 83735 ASSAY OF MAGNESIUM: CPT

## 2021-11-23 PROCEDURE — 36415 COLL VENOUS BLD VENIPUNCTURE: CPT

## 2021-11-23 PROCEDURE — C9113 INJ PANTOPRAZOLE SODIUM, VIA: HCPCS | Performed by: SURGERY

## 2021-11-23 PROCEDURE — 74011000250 HC RX REV CODE- 250: Performed by: SURGERY

## 2021-11-23 PROCEDURE — 74011250636 HC RX REV CODE- 250/636: Performed by: SURGERY

## 2021-11-23 PROCEDURE — 85027 COMPLETE CBC AUTOMATED: CPT

## 2021-11-23 PROCEDURE — 83036 HEMOGLOBIN GLYCOSYLATED A1C: CPT

## 2021-11-23 RX ORDER — ONDANSETRON 4 MG/1
4 TABLET, ORALLY DISINTEGRATING ORAL
Qty: 12 TABLET | Refills: 0 | Status: SHIPPED | OUTPATIENT
Start: 2021-11-23 | End: 2022-02-22

## 2021-11-23 RX ORDER — ENOXAPARIN SODIUM 100 MG/ML
40 INJECTION SUBCUTANEOUS EVERY 24 HOURS
Qty: 7 EACH | Refills: 0 | Status: SHIPPED | OUTPATIENT
Start: 2021-11-23 | End: 2022-02-22

## 2021-11-23 RX ORDER — ESCITALOPRAM OXALATE 10 MG/1
10 TABLET ORAL DAILY
Status: DISCONTINUED | OUTPATIENT
Start: 2021-11-23 | End: 2021-11-23 | Stop reason: HOSPADM

## 2021-11-23 RX ORDER — OXYCODONE HYDROCHLORIDE 5 MG/1
5 TABLET ORAL
Qty: 10 TABLET | Refills: 0 | Status: SHIPPED | OUTPATIENT
Start: 2021-11-23 | End: 2021-11-26

## 2021-11-23 RX ADMIN — ONDANSETRON 4 MG: 2 INJECTION INTRAMUSCULAR; INTRAVENOUS at 01:07

## 2021-11-23 RX ADMIN — OXYCODONE HYDROCHLORIDE 5 MG: 5 TABLET ORAL at 05:05

## 2021-11-23 RX ADMIN — ENOXAPARIN SODIUM 40 MG: 40 INJECTION SUBCUTANEOUS at 13:00

## 2021-11-23 RX ADMIN — ONDANSETRON 4 MG: 2 INJECTION INTRAMUSCULAR; INTRAVENOUS at 09:05

## 2021-11-23 RX ADMIN — ACETAMINOPHEN 650 MG: 325 TABLET ORAL at 01:06

## 2021-11-23 RX ADMIN — SODIUM CHLORIDE 40 MG: 9 INJECTION, SOLUTION INTRAMUSCULAR; INTRAVENOUS; SUBCUTANEOUS at 08:18

## 2021-11-23 RX ADMIN — OXYCODONE HYDROCHLORIDE 5 MG: 5 TABLET ORAL at 12:11

## 2021-11-23 RX ADMIN — ACETAMINOPHEN 650 MG: 325 TABLET ORAL at 06:16

## 2021-11-23 RX ADMIN — HYOSCYAMINE SULFATE 0.12 MG: 0.12 TABLET ORAL; SUBLINGUAL at 12:11

## 2021-11-23 RX ADMIN — ACETAMINOPHEN 650 MG: 325 TABLET ORAL at 12:11

## 2021-11-23 NOTE — ROUTINE PROCESS
Bedside verbal report given to Mary Carmen Fernandez Oncoming Nurse. Report consisted of patients Situation, Background, Assessment and   Recommendations(SBAR). Information from the following report(s): Kardex, MAR and Recent Results was reviewed with the oncoming nurse. Opportunity for questions and clarification was provided.

## 2021-11-23 NOTE — PROGRESS NOTES
Problem: Falls - Risk of  Goal: *Absence of Falls  Description: Document Stephanie Orozco Fall Risk and appropriate interventions in the flowsheet.   Outcome: Progressing Towards Goal  Note: Fall Risk Interventions:            Medication Interventions: Patient to call before getting OOB    Elimination Interventions: Call light in reach              Problem: Patient Education: Go to Patient Education Activity  Goal: Patient/Family Education  Outcome: Progressing Towards Goal     Problem: Pain  Goal: *Control of Pain  Outcome: Progressing Towards Goal     Problem: Patient Education: Go to Patient Education Activity  Goal: Patient/Family Education  Outcome: Progressing Towards Goal

## 2021-11-23 NOTE — PROGRESS NOTES
Surgery Progress Note    11/23/2021    Admit Date: 11/22/2021    Subjective:     Patient has complaints of pain and is controlled with current plan. Patient has been ambulating in halls. She reports nausea and no vomiting. Bowel Movements: None    Objective:     Blood pressure (!) 164/95, pulse 73, temperature 98.1 °F (36.7 °C), resp. rate 17, height 5' 3\" (1.6 m), weight 127.9 kg (282 lb), last menstrual period 06/16/2021, SpO2 95 %. No intake/output data recorded. 11/21 1901 - 11/23 0700  In: 2220 [P.O.:120;  I.V.:2100]  Out: 1000 [Urine:975]    EXAM: GENERAL: alert, pleasant, no distress   HEART: regular rate and rhythm   LUNGS: clear to auscultation   ABDOMEN:  Soft, obese, appropriate incisional tenderness, +BS, non-distended, surgical incisions clean, dry, no erythema or drainage   EXTREMITIES: warm, well perfused    Data Review    Recent Results (from the past 24 hour(s))   GLUCOSE, POC    Collection Time: 11/22/21 10:46 AM   Result Value Ref Range    Glucose (POC) 145 (H) 70 - 110 mg/dL   GLUCOSE, POC    Collection Time: 11/22/21  6:26 PM   Result Value Ref Range    Glucose (POC) 128 (H) 70 - 110 mg/dL   GLUCOSE, POC    Collection Time: 11/22/21 11:08 PM   Result Value Ref Range    Glucose (POC) 123 (H) 70 - 110 mg/dL   CBC W/O DIFF    Collection Time: 11/23/21  4:11 AM   Result Value Ref Range    WBC 15.0 (H) 4.6 - 13.2 K/uL    RBC 5.19 4.20 - 5.30 M/uL    HGB 13.4 12.0 - 16.0 g/dL    HCT 43.3 35.0 - 45.0 %    MCV 83.4 78.0 - 100.0 FL    MCH 25.8 24.0 - 34.0 PG    MCHC 30.9 (L) 31.0 - 37.0 g/dL    RDW 14.0 11.6 - 14.5 %    PLATELET 894 460 - 645 K/uL    MPV 10.4 9.2 - 11.8 FL    NRBC 0.0 0  WBC    ABSOLUTE NRBC 0.00 0.00 - 0.41 K/uL   METABOLIC PANEL, BASIC    Collection Time: 11/23/21  4:11 AM   Result Value Ref Range    Sodium 137 136 - 145 mmol/L    Potassium 4.3 3.5 - 5.5 mmol/L    Chloride 106 100 - 111 mmol/L    CO2 26 21 - 32 mmol/L    Anion gap 5 3.0 - 18 mmol/L    Glucose 115 (H) 74 - 99 mg/dL    BUN 13 7.0 - 18 MG/DL    Creatinine 0.82 0.6 - 1.3 MG/DL    BUN/Creatinine ratio 16 12 - 20      GFR est AA >60 >60 ml/min/1.73m2    GFR est non-AA >60 >60 ml/min/1.73m2    Calcium 8.5 8.5 - 10.1 MG/DL   MAGNESIUM    Collection Time: 11/23/21  4:11 AM   Result Value Ref Range    Magnesium 2.2 1.6 - 2.6 mg/dL   GLUCOSE, POC    Collection Time: 11/23/21  5:14 AM   Result Value Ref Range    Glucose (POC) 116 (H) 70 - 110 mg/dL       Assessment:   Moraima Fragoso is a 40 y.o. female,  day 1 status post   1. Laparoscopic lysis of adhesions. 2.  Laparoscopic wedge resection of 3 cm anterior body gastric mass. 3.  Laparoscopic Scott-en-Y gastric bypass utilizing a 155 cm retrogastric/retrocolic Scott limb, a 40 cm biliopancreatic limb, and a 15 mL separate tubularized gastric pouch based on the lesser curvature of the stomach. 4.  Laparoscopic left hepatic lobe wedge biopsy.   Condition: good    Plan:   -Ambulate every four hours  -Pain managed prior to d/c   -Nausea managed prior to d/c   -Advance to Clear liquid Gastric Bypass Diet, if able to tolerate clear liquid diet (with pro shake) 4oz per hour for 3 hours prior to d/c    If patient continues to progress d/c home later today     Tiffani Harris NP  8:13 AM  11/23/2021

## 2021-11-23 NOTE — DIABETES MGMT
Diabetes/ Glycemic Control Plan of Care  Recommendations:   1. For discharge, suggest discontinuing metformin for now and follow up with OP PCP for further management (curent A1C - 5.7%; pt was usually not taking her metformin PTA due to hypoglycemia symptoms)  2. Pt to check blood glucose BID (fasting and 2 hours after largest meal), record all results and take to PCP visits. Pt reports having a glucometer and ample supply of test strips. 3. On-going diabetes education and continue corrective lispro (normal insulin sensitivity 4 times daily) during hospitalization. Assessment:   GC consult received to assess home management and medication recommendations. Pt is s/p Laparoscopic Scott-en-Y gastric bypass 11/22/21. Past medical history includes T2DM, PCOS. Pt verified she is prescribed 500 mg metformin BID but relates she takes in about 1/8 of the time due to having \"low blood sugar\" symptoms when she takes it regularly. She states she experiences when her BG is in the 70's. She also states her PCP is aware that she usually does not take her metformin. She indicates she was first taking metformin for PCOS but then was also taking it for blood glucose control. Old A1C (pulled from EMR) - 1/24/17 - 6.0%. Pt reports she has a glucometer and supply of test strips at home. She states she usually checks her BG every morning with usual readings of  mg/dL. DX:   1. Post-op pain  oxyCODONE IR (ROXICODONE) 5 mg immediate release tablet   2. Morbid obesity with BMI of 45.0-49.9, adult ([address]     3. Gastric mass        Fasting/ Morning blood glucose:   Lab Results   Component Value Date/Time    Glucose 115 (H) 11/23/2021 04:11 AM    Glucose (POC) 116 (H) 11/23/2021 05:14 AM     IV Fluids containing dextrose: none  Steroids:   none    Blood glucose values:        Within target range (70-180mg/dL):  YES  Current insulin orders: corrective lispro, very insulin resistant dosing q 6 hours  Total Daily Dose previous 24 hours = none  Current A1c:   Lab Results   Component Value Date/Time    Hemoglobin A1c 5.7 (H) 11/23/2021 04:11 AM      Adequate glycemic control PTA: yes  Nutrition/Diet:   Active Orders   Diet    BARIATRIC DIET Full Liquid      Meal Intake:  No data found. Supplement Intake:  No data found. Home diabetes medications:   Key Antihyperglycemic Medications     Patient is on no antihyperglycemic meds. Per Care Everywhere - metformin 500 mg BID but pt reports she usually does not take     Plan/Goals:   Blood glucose will be within target of 70 - 180 mg/dl within 72 hours  Reinforce dietary and medication compliance at home.          Education:  [x] Refer to Diabetes Education Record                       [] Education not indicated at this time     Shruthi Espinoza MS, RD, [facility]  Diabetes and Glycemic Control   [employer]

## 2021-11-23 NOTE — PROGRESS NOTES
Problem: Falls - Risk of  Goal: *Absence of Falls  Description: Document Andrews Cabrera Fall Risk and appropriate interventions in the flowsheet.   Outcome: Progressing Towards Goal  Note: Fall Risk Interventions:            Medication Interventions: Assess postural VS orthostatic hypotension, Evaluate medications/consider consulting pharmacy, Patient to call before getting OOB, Teach patient to arise slowly    Elimination Interventions: Call light in reach, Elevated toilet seat, Toilet paper/wipes in reach, Toileting schedule/hourly rounds              Problem: Patient Education: Go to Patient Education Activity  Goal: Patient/Family Education  Outcome: Progressing Towards Goal     Problem: Pain  Goal: *Control of Pain  Outcome: Progressing Towards Goal  Goal: *PALLIATIVE CARE:  Alleviation of Pain  Outcome: Progressing Towards Goal     Problem: Patient Education: Go to Patient Education Activity  Goal: Patient/Family Education  Outcome: Progressing Towards Goal     Problem: Nutrition Deficit  Goal: *Optimize nutritional status  Outcome: Progressing Towards Goal     Problem: Patient Education: Go to Patient Education Activity  Goal: Patient/Family Education  Outcome: Progressing Towards Goal

## 2021-11-23 NOTE — PROGRESS NOTES
Pt stable for discharge. IV and arm bands removed. Scripts given. Pt self administered lovenox. Teaching done.

## 2021-11-23 NOTE — ROUTINE PROCESS
Bedside shift change report given to Batsheva Casey (oncoming nurse) by Eddie Flowers (offgoing nurse). Report included the following information SBAR and Kardex.

## 2021-11-23 NOTE — DIABETES MGMT
Diabetes Patient/Family Education Record    Factors That May Influence Patients Ability to Learn or Comply with Recommendations   []   Language barrier    []   Cultural needs   []   Motivation    []   Cognitive limitation    []   Physical   []   Education    []   Physiological factors   []   Hearing/vision/speaking impairment   []   Mu-ism beliefs    []   Financial factors   []  Other:   [x]  No factors identified at this time. Person Instructed:   [x]   Patient   []   Family   [x]  Other - significant other at bedside     Preference for Learning:   [x]   Verbal   [x]   Written  Diabetes eduction book  Target A1C and BG   []  Demonstration     Level of Comprehension & Competence:    [x]  Good                                      [] Fair                                     []  Poor                             []  Needs Reinforcement   []  Teach back completed    Education Component:   [x]  Medication management, including how to administer insulin (if appropriate) and potential medication interactions    []  Nutritional management - [] Obtained usual meal pattern   []   Basic carbohydrate counting  []  Plate method  []  Limit concentrated sweets and avoid sweetened beverages  []  Portion control  []    Avoid skipping meals   []  Exercise   [x]  Signs, symptoms, and treatment of hyperglycemia and hypoglycemia   [x] Prevention, recognition and treatment of hyperglycemia and hypoglycemia   []  Importance of blood glucose monitoring  [x] Blood Glucose targets   []   Provided patient with blood glucose meter  [x]  Has glucometer and supplies at home   [x]  Instruction on use of the blood glucose meter and recommended monitoring schedule   [x]  Discuss the importance of HbA1C monitoring. Patients A1c is 5.7 %.  This is equivalent to average glucose of 117 mg/dl for the past 2-3 months.   []  Sick day guidelines   []  Proper use and disposal of lancets, needles, syringes or insulin pens (if appropriate)   []  Potential long-term complications (retinopathy, kidney disease, neuropathy, foot care)   [] Information about whom to contact in case of emergency or for more information    [x]  Goal:  Patient/family will demonstrate understanding of Diabetes Self- Management Skills by: (date) _______  Plan for post-discharge education or self-management support:    [] Outpatient class schedule provided            [] Patient Declined    [] Scheduled for outpatient classes (date) _______    [x] Written information provided  Verify: [x] Prior to admission Diabetes medications    Does patient understand how diabetes medications work? ___yes_________________________  Does patient have difficulty obtaining diabetes medications or testing supplies?  _no________________     Daisy Ardon MS, RD, [facility]  Diabetes and Glycemic Control   [employer]

## 2021-11-23 NOTE — OP NOTES
[facility]  OPERATIVE REPORT    Name:  Maria Esther Ortiz  MR#:   [id number]  :  1975  ACCOUNT #:  [de-identified]  DATE OF SERVICE:  2021    PREOPERATIVE DIAGNOSIS:  Super obesity with body mass index of 50 with obesity-related comorbidities of hypertension, hypercholesterolemia, gastroesophageal reflux disease, prediabetes, clinical obstructive sleep apnea, polycystic ovarian syndrome, carpal tunnel syndrome, and weight-related arthropathy. POSTOPERATIVE DIAGNOSES:  1. Intraabdominal adhesive disease. 2.  A 3-cm anterior body gastric mass. 3.  Super obesity with body mass index of 50 with obesity-related comorbidities consisting of hypertension, prediabetes, hypercholesterolemia, gastroesophageal reflux disease, reactive airway disease, clinical obstructive sleep apnea, polycystic ovarian syndrome, carpal tunnel syndrome, and weight-related arthropathy. 4.  Hepatic steatosis secondary to metabolic syndrome. PROCEDURES PERFORMED:  1. Laparoscopic lysis of adhesions. 2.  Laparoscopic wedge resection of 3 cm anterior body gastric mass. 3.  Laparoscopic Scott-en-Y gastric bypass utilizing a 155 cm retrogastric/retrocolic Scott limb, a 40 cm biliopancreatic limb, and a 15 mL separate tubularized gastric pouch based on the lesser curvature of the stomach. 4.  Laparoscopic left hepatic lobe wedge biopsy. SURGEON:  Renelda Dust. Jameson Burkitt, DO.    FIRST ASSISTANT:  Melita Eddy SA.    ANESTHESIA:  General endotracheal supplemented at the conclusion of the operative procedure with local infiltration of the operative sites utilizing 266 mg of Exparel diluted in 50 mL of normal saline solution. COMPLICATIONS:  None. SPECIMENS REMOVED:  1. Anterior gastric body mass. 2.  Left hepatic lobe wedge resection. IMPLANTS: None. ESTIMATED BLOOD LOSS:  Less than 25 mL.     OPERATIVE FINDINGS:  The patient had intra-abdominal adhesive disease consisting of omental adhesions to the anterior abdominal wall secondary to her previous abdominal surgical procedures. The patient additionally had a hepatomegaly with morphologic appearance of hepatic steatosis, likely secondary to her metabolic syndrome. The patient in addition had an anterior gastric body mass measuring approximately 3 cm. INDICATIONS FOR SURGICAL PROCEDURE:  This is a 80-year-old female who has failed medical management of clinically severe obesity and after investigating the surgical options, she has elected to proceed with laparoscopic, potentially open Scott-en-Y gastric bypass to achieve definitive durable weight loss on a personal level with expected resolution of obesity-related comorbidities. The risks and benefits of operative versus nonoperative potential alternatives and potential complications up to and including death were extensively discussed with the patient prior to the surgical procedure who voiced her understanding and wished to proceed. DESCRIPTION OF PROCEDURE:  The patient received Lovenox for DVT prophylaxis and Cleocin for antibiotic prophylaxis secondary to PENICILLIN allergy in the preoperative staging area. After voiding and signing her operative permit, which was properly witnessed, she was then transported to the operating room, where she was placed in the supine position on the operating table. SCDs were placed and inflated prior to induction of general endotracheal anesthesia. A 34-Dominican orogastric tube was then placed atraumatically to gravity drainage, and the abdomen was then prepped and draped in the usual sterile fashion. A transverse 12-mm cutaneous incision was made just superior to the umbilicus through a preexisting incision and a 12-mm OptiView trocar and cannula were placed in the peritoneal cavity under direct vision utilizing a 10-mm 0-degree laparoscope. The laparoscope and trocar were removed.   The abdomen was insufflated with carbon dioxide gas never exceeding a pressure of 15 mmHg and a 30-degree 10-mm laparoscope was placed and utilized for the remainder of the procedure. Remaining ports were placed under direct vision through transverse cutaneous incision utilizing OptiView trocars and cannulas. The second a -5-mm incision in the left anterior axillary line two fingerbreadths below the left costal margin, the third a 12-mm incision midway between the left upper quadrant and the supraumbilical incision, and the fourth a 12-mm incision in the right upper quadrant in a paramedian location 8 cm from the midline midway between the costal margin and level of the umbilicus. After placing the appropriately sized OptiView trocars and cannulas, the upper abdomen was visualized. The patient was noted to have hepatomegaly with morphologic appearance of hepatic steatosis. The omentum and transverse colon were attempted to be translocated superiorly. This was unable to be accomplished secondary to intraabdominal adhesive disease and the laparoscope was shifted to the left midabdominal port to allow visualization of the anterior abdominal wall notable for multiple omental adhesions to the anterior abdominal wall secondary to previous surgical procedures. These were taken down utilizing the Harmonic scalpel and the laparoscope was then shifted back to the supraumbilical port site. The omentum and transverse colon were reflected superiorly. The ligament of Treitz was identified; 40 cm distal to the ligament of Treitz, the jejunum was transected with a triple-load stapling device, 60 mm in length, loaded with 2.5-mm staples. The mesentery was then divided down to its root utilizing the Harmonic scalpel. The Scott limb was measured to be 155 cm in length and at this point on its antimesenteric border, an enterotomy was created with Harmonic scalpel.   A similar antimesenteric enterotomy was created 2 cm proximal to the staple line of the biliopancreatic limb and a stapled side-to-side functional end-to-side jejunojejunostomy was constructed utilizing a triple-load stapling device, 60 mm in length, loaded with 2.5-mm staples, introducing one arm of the stapling device into each of the respective limbs prior to firing it on the antimesenteric borders. The remaining enteric defect was then closed with a running full-thickness 3-0 Vicryl suture and the mesenteric defect was closed with a running 2-0 silk suture. The ligament of Treitz was re-identified. Just anterior to the ligament of Treitz, a fenestration was created through the mesocolon into the lesser sac utilizing the Harmonic scalpel and the Scott limb was then placed through this fenestration into the lesser sac. The omentum and transverse colon were reflected back to their normal anatomic positions. The blas was identified along the lesser curvature of the stomach. Just inferior to the blas along the greater curvature of the stomach, the omentum was  from the gastric wall utilizing the Harmonic scalpel until the Scott limb was visualized within the lesser  sac. A transverse 5-mm cutaneous incision was then made one-third the distance from the xiphisternal junction to the umbilicus in the midline, through which a 5-mm trocar without a cannula was placed in the peritoneal cavity under direct vision. This was removed and replaced with a 5-mm atraumatic grasping forceps, which was utilized in conjunction with a self-retaining retractor to elevate the left lobe of the liver and provide hiatal exposure. At this point in time, it was noted the patient had a mid anterior gastric body mass measuring approximately 3 cm. The peritoneum overlying the angle of His was opened with the Harmonic scalpel; 5 cm distal to the GE junction along the lesser curvature of the stomach, the neurovascular elements of the lesser omentum were  from the gastric wall utilizing a Harmonic scalpel.   Completion of this lesser curve fenestration into the lesser sac was accomplished with an esophageal retractor introduced posterior to the stomach through the omental fenestration along the greater curvature of the stomach. The 34-Maltese orogastric tube was then retracted into the esophagus. A triple-load stapling device, 60 mm in length, loaded with 3.5-mm staples was introduced into the lesser curve fenestration. It was oriented perpendicular to the lesser curve 5 cm distal to the GE junction prior to firing two-thirds the length of the staple load. The 34-Maltese orogastric tube was then advanced utilizing the sizing template for construction of the tubularized gastric pouch based on the lesser curvature of the stomach. The vertical aspect of the pouch was initiated with a triple-load stapling device, 60 mm in length, loaded with 3.5-mm staples utilizing two-thirds the length of a staple load. The remainder of the pouch was constructed with sequential firings of a triple-load stapling device, 60 mm in length, loaded with 3.5-mm staples ending the transection at the angle of His. It should be noted that the initial full-length 3.5-mm staple load utilized an Ethicon staple line reinforcement absorbable implant and this then created a 15 mL separate tubularized gastric pouch based on the lesser curvature of the stomach. The Scott limb was then elevated posterior to the stomach in retrogastric position, where a tension-free hand-sewn two-layered gastrojejunostomy was constructed. The geometric orientation of the gastrojejunostomy was at the distal aspect of the tubularized gastric pouch and the antimesenteric border of the Scott limb 2 cm distal to the staple line. The posterior row of running seromuscular 3-0 Vicryl suture was then placed. A transverse 12-mm gastrotomy was made at the distal aspect of the tubularized gastric pouch with an adjacent antimesenteric 12-mm Scott limb enterotomy.   The running inner layer of full-thickness 3-0 Vicryl suture was initiated in the left lateral posterior aspect of the anastomosis, run across the posterior aspect of the anastomosis, run around the right lateral aspect of the anastomosis to the anterior midline. A second full-thickness 3-0 Vicryl suture was initiated adjacent to the origin of the first and run around the left lateral aspect of the anastomosis to the anterior midline. The 34-English orogastric tube was advanced across the gastrojejunal anastomosis into the Scott limb prior to tying with a running inner layer of full-thickness 3-0 Vicryl suture together anteriorly. The gastrojejunal anastomosis was completed anteriorly utilizing a running 3-0 Vicryl seromuscular suture. The 3-cm anterior gastric body tumor was then resected utilizing a wedge resection with adequate margins with a triple-load stapling device loaded with 3.5-mm staples, 60 mm in length. After resection, the specimen was placed in an EndoCatch bag and removed for definitive histologic characterization. The 34-English orogastric tube was then removed and after elevating the omentum and transverse colon back to their normal anatomic positions, the space through which a  Finnegan's hernia might occur was closed with a running 2-0 silk suture and the mesocolic defect was reapproximated with a series of simple interrupted 2-0 Vicryl sutures. The omentum and transverse colon were reflected back to their normal anatomic position. The self-retaining retractor and 5-mm atraumatic grasping forceps were removed. The free edge of the left lobe of the liver was then retracted in such a fashion so that a 1.5-cm wedge-shaped biopsy could be obtained sharply, which was submitted for definitive histologic characterization. Hemostasis was then established with electrocautery.   The laparoscope was shifted to the left midabdominal port to allow visualization of the supraumbilical l fascial trocar defect, which was closed with a suture passing device and #2 Vicryl suture. All operative sites were inspected and noted to be hemostatic. The abdomen was then desufflated of carbon dioxide gas. Trocars were removed under direct vision. The fascial suture was tied. The wounds were irrigated with normal saline solution. Closure of the skin was accomplished with a series of simple interrupted buried deep dermal 4-0 Monocryl sutures. The incisions were infiltrated with 266 mg of Exparel diluted in 50 mL of normal saline. Steri-Strips were applied as were sterile dressings. The sponge and needle counts were correct both during and at the completion of the procedure. The patient tolerated the procedure without operative complications, subsequently was extubated and transported to the recovery room in awake, alert, and stable condition. Estimated blood loss was less than 25 mL. The patient received 1 liter of crystalloid during the procedure. Operative start time was 0800, completion time was 0953.         Wilfrido Santana DO      [id number]  D:  11/22/2021 10:47  T:  11/23/2021 2:15  JOB #:  [id number]

## 2021-11-23 NOTE — PROGRESS NOTES
Reason for Admission:  [facility] [E66.01]  Essential hypertension [I10]  Morbid obesity with BMI of 45.0-49.9, adult ([facility] [E66.01, Z68.42]                 RUR Score:    4%          Plan for utilizing home health:    No                      Likelihood of Readmission:   LOW                         Transition of Care Plan:              Initial assessment completed with patient. Cognitive status of patient: oriented to time, place, person and situation. Face sheet information confirmed:  yes. The patient designates her spouse Maryam Sosa to participate in her discharge plan and to receive any needed information. This patient lives in a single family home with patient and spouse. Patient is able to navigate steps as needed. Prior to hospitalization, patient was considered to be independent with ADLs/IADLS : yes . Patient has a current ACP document on file: yes      Healthcare Decision Maker:   Primary Decision Maker: Prachidustin Rivero - [phone]    Click here to complete [place] including selection of the Healthcare Decision Maker Relationship (ie \"Primary\")    The patient and spouse will be available to transport patient home upon discharge. The patient doesn't have any DMEs at home. Patient is not currently active with home health. Patient has not stayed in a skilled nursing facility or rehab. This patient is on dialysis :no    Currently, the discharge plan is Home. The patient states that she can obtain her medications from the pharmacy, and take her medications as directed. Patient's current insurance is [employer].         Care Management Interventions  Support Systems: Spouse/Significant Other        Arielle Pop RN - Outcomes Manager  [phone]

## 2021-11-23 NOTE — DISCHARGE SUMMARY
Bariatric Surgery Discharge Progress Note    Admission Date: 11/22/2021    Discharge Date: 11/23/2021    PREOPERATIVE DIAGNOSIS:  Super obesity with body mass index of 50 with obesity-related comorbidities of hypertension, hypercholesterolemia, gastroesophageal reflux disease, prediabetes, clinical obstructive sleep apnea, polycystic ovarian syndrome, carpal tunnel syndrome, and weight-related arthropathy. POSTOPERATIVE DIAGNOSES:  1. Intraabdominal adhesive disease. 2.  A 3-cm anterior body gastric mass. 3.  Super obesity with body mass index of 50 with obesity-related comorbidities consisting of hypertension, prediabetes, hypercholesterolemia, gastroesophageal reflux disease, reactive airway disease, clinical obstructive sleep apnea, polycystic ovarian syndrome, carpal tunnel syndrome, and weight-related arthropathy. 4.  Hepatic steatosis secondary to metabolic syndrome. PROCEDURES PERFORMED:  1. Laparoscopic lysis of adhesions. 2.  Laparoscopic wedge resection of 3 cm anterior body gastric mass. 3.  Laparoscopic Scott-en-Y gastric bypass utilizing a 155 cm retrogastric/retrocolic Scott limb, a 40 cm biliopancreatic limb, and a 15 mL separate tubularized gastric pouch based on the lesser curvature of the stomach. 4.  Laparoscopic left hepatic lobe wedge biopsy. Postop Complications: none    Hospital Course:  Patient was admitted on 11/22/2021 for scheduled bariatric surgery. Operation was without significant complication. Patient admitted to the floor postoperatively, monitored as per protocol. Diet sequentially advanced beginning POD 1, pain medications transitioned to oral during the hospital course. Currently the patient is afebrile, vital signs stable, tolerating a clear liquid diet with protein supplementation, voiding spontaneously, ambulatory with adequate pain control with oral medications and clear surgical sites without evidence of infection.     Discharge Diet:  Clear Liquid Bariatric Diet for 7 days, then soft moist protein diet for 5 weeks    Discharge Medications:  Discharge Medication List as of 11/23/2021  2:24 PM        START taking these medications    Details   enoxaparin (LOVENOX) 40 mg/0.4 mL 0.4 mL by SubCUTAneous route every twenty-four (24) hours. , Normal, Disp-7 Each, R-0      oxyCODONE IR (ROXICODONE) 5 mg immediate release tablet Take 1 Tablet by mouth every six (6) hours as needed for Pain for up to 3 days. Max Daily Amount: 20 mg., Normal, Disp-10 Tablet, R-0      ondansetron (ZOFRAN ODT) 4 mg disintegrating tablet Take 1 Tablet by mouth every eight (8) hours as needed for Nausea or Vomiting., Normal, Disp-12 Tablet, R-0           CONTINUE these medications which have NOT CHANGED    Details   albuterol (PROVENTIL HFA, VENTOLIN HFA, PROAIR HFA) 90 mcg/actuation inhaler Take 2 Puffs by inhalation every four (4) hours as needed for Wheezing., Historical Med      dextroamphetamine-amphetamine (ADDERALL) 10 mg tablet Take 5 mg by mouth two (2) times a day., Historical Med      losartan (COZAAR) 50 mg tablet Take 50 mg by mouth daily. , Historical Med      cholecalciferol, vitamin D3, 50 mcg (2,000 unit) tab Take 2,000 Units by mouth daily. , Historical Med      ergocalciferol (ERGOCALCIFEROL) 1,250 mcg (50,000 unit) capsule Take 50,000 Units by mouth every seven (7) days. , Historical Med      multivit with calcium,iron,min [facility] MULTIPLE VITAMINS PO) Take 1 Tablet by mouth daily. , Historical Med      escitalopram oxalate (Lexapro) 10 mg tablet Take 10 mg by mouth daily. , Historical Med      albuterol-ipratropium (DUO-NEB) 2.5 mg-0.5 mg/3 ml nebu USE 3 ML VIA NEBULIZER EVERY 4 HOURS AS NEEDED FOR SHORTNESS OF BREATH, Historical Med      montelukast (SINGULAIR) 10 mg tablet Take 10 mg by mouth nightly., Historical Med      rizatriptan (MAXALT) 5 mg tablet Take 5 mg by mouth daily as needed., Historical Med      fexofenadine (ALLEGRA) 180 mg tablet Take 180 mg by mouth daily. , Historical Med      gabapentin (NEURONTIN) 300 mg capsule Take 300 mg by mouth three (3) times daily. , Historical Med      acetaminophen (TylenoL) 325 mg tablet Take  by mouth every four (4) hours as needed for Pain., Historical Med           STOP taking these medications       ferrous sulfate 325 mg (65 mg iron) tablet Comments:   Reason for Stopping:         omeprazole (PRILOSEC) 20 mg capsule Comments:   Reason for Stopping:         ascorbic acid, vitamin C, (Vitamin C) 500 mg tablet Comments:   Reason for Stopping:         metoprolol succinate (TOPROL-XL) 50 mg XL tablet Comments:   Reason for Stopping:         potassium chloride (KLOR-CON) 10 mEq tablet Comments:   Reason for Stopping:         furosemide (Lasix) 40 mg tablet Comments:   Reason for Stopping:                 Bariatric Chewable vitamins, 2 orally daily for life  Calcium Citrate 1500 mg orally daily for life  Vitamin B12 1000 micrograms sublingual daily for life  Vitamin D3 5,000 units orally daily for life   Vitamin B1 100 mg orally daily for life    Discharge disposition: home    Discharge condition: stable      Local wound care with daily showers, keep wounds clean and dry     Activity: as desired, no lifting, pushing, or pulling greater than 15lbs or situps for 30 days     Special Instructions:            - No driving until activity is not influenced by incisional pain and off narcotics            - No bath or hot tub until wounds are healed            - Check pulse and temperature twice daily for 10 days            - Notify [employer] for a Temp >100.5 or Pulse>115     Follow-up with your surgeon in 2 weeks, call office for appointment [phone] ([address] [place] [address]) [phone]([address])

## 2021-11-23 NOTE — ROUTINE PROCESS
Patient is alert and oriented times three with no signs or symptoms of distress. Lapsites are clean dry and intact no vomiting at this time but she does feel nauseated.   Dayshift nurse just gave her nausea medication

## 2021-11-24 ENCOUNTER — TELEPHONE (OUTPATIENT)
Dept: SURGERY | Age: 46
End: 2021-11-24

## 2021-11-24 NOTE — TELEPHONE ENCOUNTER
Patient states she was just discharged from hospital from having LGBP. Patient states steri strip came off and is bleeding from incision site. Patient states has been bleeding for about 2 hours. Patient states has been applying pressure and has placed gauze and tape but still bleeding. I advised patient I will contact Ellen Del Toro NP and give her a call back. I contacted Ellen Del Toro NP and she stated to have patient come into office either depaul or hbv to have steri strips replaced and reinforce with gauze and tape. I advised Ellen Del Toro I did feel comfortable replacing the steri strips if patient decided to come to depaul. Haseeb Mcgrath also stated patient can make an appt to see her today at [facility] as well. I called patient to advise of message per Ellen Del Toro NP. Patient denied both request for Depaul and HBV and asked if she can go to [facility] ER due to it is 5 minutes away from her home. I advised patient any ER should be able to assist her with replacing steri strips on incision. I advised patient to call office if she needed any further assistance. Patient verbalized understanding.

## 2021-11-25 ENCOUNTER — HOSPITAL ENCOUNTER (OUTPATIENT)
Age: 46
Setting detail: OBSERVATION
LOS: 1 days | Discharge: HOME | End: 2021-11-25
Attending: SURGERY | Admitting: SURGERY
Payer: OTHER GOVERNMENT

## 2021-11-25 VITALS
TEMPERATURE: 97.4 F | HEART RATE: 93 BPM | DIASTOLIC BLOOD PRESSURE: 72 MMHG | HEIGHT: 63 IN | WEIGHT: 285 LBS | RESPIRATION RATE: 18 BRPM | SYSTOLIC BLOOD PRESSURE: 120 MMHG | BODY MASS INDEX: 50.5 KG/M2 | OXYGEN SATURATION: 76 %

## 2021-11-25 PROBLEM — R89.9 ABNORMAL LABORATORY TEST: Status: ACTIVE | Noted: 2021-11-25

## 2021-11-25 LAB
ANION GAP SERPL CALC-SCNC: 7 MMOL/L (ref 3–18)
BASOPHILS # BLD: 0 K/UL (ref 0–0.1)
BASOPHILS NFR BLD: 0 % (ref 0–2)
BUN SERPL-MCNC: 12 MG/DL (ref 7–18)
BUN/CREAT SERPL: 12 (ref 12–20)
CALCIUM SERPL-MCNC: 7.9 MG/DL (ref 8.5–10.1)
CHLORIDE SERPL-SCNC: 108 MMOL/L (ref 100–111)
CO2 SERPL-SCNC: 26 MMOL/L (ref 21–32)
CREAT SERPL-MCNC: 1.01 MG/DL (ref 0.6–1.3)
DIFFERENTIAL METHOD BLD: ABNORMAL
EOSINOPHIL # BLD: 1.8 K/UL (ref 0–0.4)
EOSINOPHIL NFR BLD: 13 % (ref 0–5)
ERYTHROCYTE [DISTWIDTH] IN BLOOD BY AUTOMATED COUNT: 14.6 % (ref 11.6–14.5)
GLUCOSE SERPL-MCNC: 104 MG/DL (ref 74–99)
HCT VFR BLD AUTO: 29.4 % (ref 35–45)
HGB BLD-MCNC: 8.8 G/DL (ref 12–16)
IMM GRANULOCYTES # BLD AUTO: 0 K/UL
IMM GRANULOCYTES NFR BLD AUTO: 0 %
LYMPHOCYTES # BLD: 2.3 K/UL (ref 0.9–3.6)
LYMPHOCYTES NFR BLD: 17 % (ref 21–52)
MCH RBC QN AUTO: 26 PG (ref 24–34)
MCHC RBC AUTO-ENTMCNC: 29.9 G/DL (ref 31–37)
MCV RBC AUTO: 87 FL (ref 78–100)
MONOCYTES # BLD: 0.7 K/UL (ref 0.05–1.2)
MONOCYTES NFR BLD: 5 % (ref 3–10)
NEUTS SEG # BLD: 8.9 K/UL (ref 1.8–8)
NEUTS SEG NFR BLD: 65 % (ref 40–73)
NRBC # BLD: 0 K/UL (ref 0–0.01)
NRBC BLD-RTO: 0 PER 100 WBC
PLATELET # BLD AUTO: 260 K/UL (ref 135–420)
PLATELET COMMENTS,PCOM: ABNORMAL
PMV BLD AUTO: 10.3 FL (ref 9.2–11.8)
POTASSIUM SERPL-SCNC: 4.2 MMOL/L (ref 3.5–5.5)
RBC # BLD AUTO: 3.38 M/UL (ref 4.2–5.3)
RBC MORPH BLD: ABNORMAL
SODIUM SERPL-SCNC: 141 MMOL/L (ref 136–145)
WBC # BLD AUTO: 13.7 K/UL (ref 4.6–13.2)

## 2021-11-25 PROCEDURE — 36415 COLL VENOUS BLD VENIPUNCTURE: CPT

## 2021-11-25 PROCEDURE — 99024 POSTOP FOLLOW-UP VISIT: CPT | Performed by: SURGERY

## 2021-11-25 PROCEDURE — 96374 THER/PROPH/DIAG INJ IV PUSH: CPT

## 2021-11-25 PROCEDURE — 2709999900 HC NON-CHARGEABLE SUPPLY

## 2021-11-25 PROCEDURE — 80048 BASIC METABOLIC PNL TOTAL CA: CPT

## 2021-11-25 PROCEDURE — 85025 COMPLETE CBC W/AUTO DIFF WBC: CPT

## 2021-11-25 PROCEDURE — G0378 HOSPITAL OBSERVATION PER HR: HCPCS

## 2021-11-25 PROCEDURE — 74011250636 HC RX REV CODE- 250/636: Performed by: SURGERY

## 2021-11-25 RX ORDER — HYDROMORPHONE HYDROCHLORIDE 1 MG/ML
0.5 INJECTION, SOLUTION INTRAMUSCULAR; INTRAVENOUS; SUBCUTANEOUS
Status: DISCONTINUED | OUTPATIENT
Start: 2021-11-25 | End: 2021-11-25 | Stop reason: HOSPADM

## 2021-11-25 RX ORDER — ONDANSETRON 2 MG/ML
4 INJECTION INTRAMUSCULAR; INTRAVENOUS
Status: DISCONTINUED | OUTPATIENT
Start: 2021-11-25 | End: 2021-11-25 | Stop reason: HOSPADM

## 2021-11-25 RX ORDER — SODIUM CHLORIDE, SODIUM LACTATE, POTASSIUM CHLORIDE, CALCIUM CHLORIDE 600; 310; 30; 20 MG/100ML; MG/100ML; MG/100ML; MG/100ML
150 INJECTION, SOLUTION INTRAVENOUS CONTINUOUS
Status: DISCONTINUED | OUTPATIENT
Start: 2021-11-25 | End: 2021-11-25 | Stop reason: HOSPADM

## 2021-11-25 RX ADMIN — HYDROMORPHONE HYDROCHLORIDE 0.5 MG: 1 INJECTION, SOLUTION INTRAMUSCULAR; INTRAVENOUS; SUBCUTANEOUS at 05:29

## 2021-11-25 RX ADMIN — SODIUM CHLORIDE, SODIUM LACTATE, POTASSIUM CHLORIDE, AND CALCIUM CHLORIDE 150 ML/HR: 600; 310; 30; 20 INJECTION, SOLUTION INTRAVENOUS at 05:21

## 2021-11-25 NOTE — PROGRESS NOTES
Problem: Patient Education: Go to Patient Education Activity  Goal: Patient/Family Education  Outcome: Progressing Towards Goal     Problem: Falls - Risk of  Goal: *Absence of Falls  Description: Document Sofya Hartley Fall Risk and appropriate interventions in the flowsheet.   Outcome: Progressing Towards Goal  Note: Fall Risk Interventions:            Medication Interventions: Teach patient to arise slowly

## 2021-11-25 NOTE — PROGRESS NOTES
Pt received to unit in bed 520 at 0420. Report received from Albertina Blackburn RN at [facility] main ED. Pt vital signs stable, Pt complains of pain in the abdomen. Will continue to monitor gastric bypass incision site.

## 2021-11-25 NOTE — DISCHARGE SUMMARY
Discharge note  Admission diagnoses: Transfer Raissa Flores for concern over decreased hematocrit status post gastric bypass November 22, 2021  Discharge diagnosis: Status post gastric bypass with mild asymptomatic postoperative anemia and no evidence of ongoing bleeding  Consultations: None  Complications: None  Pertinent physical examination: Stable vital signs appropriately healing wounds with minimal surrounding ecchymosis and induration with otherwise normal evaluation  Hospital course: Patient is transferred from the outlying facility was delayed more than 12 hours. A repeat hematocrit was performed here with noted increase from admission.   The patient is otherwise is asymptomatic will be discharged home

## 2021-11-25 NOTE — ROUTINE PROCESS
Patient discharged home. Discharge instructions given and she verbalized understanding to continue diet,medications and activity as outlined in her blue education binder.

## 2021-11-25 NOTE — H&P
[facility]  HISTORY AND PHYSICAL    Name:  Payton Cohen  MR#:   [id number]  :  1975  ACCOUNT #:  [de-identified]  ADMIT DATE:  2021    DATE OF SURGICAL PROCEDURE:  2021    REASON FOR ADMISSION:  Transferred from outline facility with concern for bleeding, status post laparoscopic gastric bypass on 2021. HISTORY OF CHIEF COMPLAINT:  This is a 59-year-old white female who is status post laparoscopic lysis of adhesions, laparoscopic wedge resection of a gastrointestinal stromal tumor, laparoscopic gastric bypass, laparoscopic left hepatic lobe wedge biopsy on 2021. The patient's immediate postoperative course was unremarkable and she was discharged on postop day #1. She, however, on postoperative day #2, developed bleeding from her incisional sites and presented to [facility]. The patient was noted on the day after her surgical procedure to have a hematocrit of 43.3; and upon representation at [facility], was noted to have an initial hematocrit probably drawn at 11 in the morning on 2021 noted to be 31.5 with a subsequent followup hematocrit of 28.8 at 3 p.m. The patient in addition underwent abdominal pelvic CT scanning which noted the presence of high-density fluid along the inferior aspect of the remnant stomach likely hemorrhage, measuring approximately 13.4 cm. Because of the potential concern for ongoing bleeding, the patient was transferred to [facility]. The time of initial approval, the transfer was about 4 in the afternoon, but the patient arrived here at [facility] 12 hours later at approximately 4 in the morning. The patient during this whole period of time has noted resolution of the bleeding from the port site, for which she initially presented at [facility].   Subsequent to her discharge, she is meeting her fluid goals of 64 ounces a day, has no orthostatic symptoms, and is urinating approximately 5-6 times on a daily basis. The patient was transferred because of the concern for ongoing hemorrhage at the request of the transferring physicians. ALLERGIES:  MINERAL OIL, PENICILLIN, REGLAN. CURRENT MEDICATIONS:  1. Bariatric multivitamins as prescribed. 2.  Lovenox 40 mg daily. 3.  Oxycodone 5 mg q.6 hours p.r.n.  4.  Zofran 4 mg q.6 hours p.r.n.  5.  Albuterol inhaler two puffs four times a day. 6.  Adderall 5 mg b.i.d.  7.  Cozaar 50 mg daily. 8.  Lexapro 10 mg daily. 9.  DuoNeb two puffs q.4 hours p.r.n.  10.  Singulair 10 mg daily. 11.  Maxalt 5 mg p.r.n. 12.  Allegra 180 mg p.r.n.  13.  Neurontin 300 mg t.i.d. PAST MEDICAL HISTORY:  1. Super obesity with obesity-related comorbidities consisting of hypertension, gastroesophageal reflux disease, reactive airway disease, clinical obstructive sleep apnea, weight-related arthropathy, carpal tunnel syndrome, and polycystic ovarian syndrome status post laparoscopic gastric bypass. 2.  Allergic rhinitis. 3.  History of nephrolithiasis, status post ureteroscopy. 4.  History of peptic ulcer disease, status post perforated duodenal ulcer, requiring laparoscopic Ashkan patch in . PAST SURGICAL HISTORY:  1. Laparoscopic cholecystectomy in . 2.  Ureteroscopy x2 in  and .  3.  Laparoscopic Ashkan patch for perforated duodenal ulcer. 4.  Laparoscopic gastric bypass, liver biopsy, lysis of adhesions, resection of GIST tumor in 2021. SOCIAL HISTORY:  The patient quit smoking in 2021 with prior history of approximately 29 years' duration. Alcohol:  None. FAMILY HISTORY:  Mother is 67, clinically severely obese, hypertension, coronary artery disease, and adult onset diabetes mellitus. Father  at 71 with hypertension, congestive heart failure, and renal failure. Sister 50, healthy. REVIEW OF SYSTEMS:  Negative with the exception of the pertinent positives noted.     PHYSICAL EXAMINATION:  VITAL SIGNS:  The patient's temperature is 98.5, pulse is 83, respiratory rate 18, blood pressure is 116/71, and room air oxygen saturations are 98%. GENERAL:  The patient is alert, nontoxic, oriented, and conversant. Remainder physical examination is notable for healing of surgical site which is well approximated without any evidence of bleeding currently with appropriate surrounding ecchymosis and induration. Remainder physical examination was unremarkable. LABORATORY EVALUATION:  As described. Postoperative hematocrit on postop day #1 after surgery on 11/23/2021 noted the hematocrit of 43.3 with hemoglobin of 13.4. Repeat hematocrit on 11/24/2021 at 11 a.m. was 31.5 and subsequently at 3 p.m. on 11/24/2021 was 28.8. After transferred to this institution, her hematocrit this morning is 29.4 with a hemoglobin of 8.8. Abdominal CT scan at [facility] on 11/24/2021 noted 13 cm collection near the inferior aspect of the remnant stomach consistent with hematoma. IMPRESSION:  Mild postoperative anemia secondary to recent surgery without evidence of ongoing bleeding. RECOMMENDATION:  The patient was admitted and a repeat hematocrit was drawn because of the delayed transfer and her clinical status. In that she is asystematic, tolerating a diet, urinating as expected. We will discharge her home with followup as already scheduled in one week apparently with [address] [place] [address] [facility].       Dennys Jj DO      [id number]  D:  11/25/2021 9:01  T:  11/25/2021 13:54  JOB #:  [id number]

## 2021-11-30 NOTE — PROGRESS NOTES
Pt called to report for past couple of days she has had elevated temp ranging from 99 to 102. 102 was last night and is running around 99 today. Pt took HR with pulse ox while on phone and reported HR was 117. Getting in 64 oz fluids with 2 protein shakes. Urinating regularly. Some epigastric pain, but pt thinks it might be gas. She did pass gas and pain improved. Nausea off and on for past couple of days with some vomiting. Discussed with Dr. Karrie Mendoza, pt can get elevated temp from breaking down hematoma found on CT last week. Discussed to drink fluids slowly as well as chew foods well and eat slowly. If still having symptoms, call tomorrow and will get on my schedule for Thursday. If symptoms worsen, go to ER, pt verbalized understanding of plan.

## 2021-12-09 ENCOUNTER — OFFICE VISIT (OUTPATIENT)
Dept: SURGERY | Age: 46
End: 2021-12-09
Payer: OTHER GOVERNMENT

## 2021-12-09 VITALS
WEIGHT: 267.8 LBS | TEMPERATURE: 98 F | HEIGHT: 63 IN | HEART RATE: 80 BPM | BODY MASS INDEX: 47.45 KG/M2 | DIASTOLIC BLOOD PRESSURE: 85 MMHG | SYSTOLIC BLOOD PRESSURE: 141 MMHG | OXYGEN SATURATION: 95 %

## 2021-12-09 DIAGNOSIS — K91.2 POSTOPERATIVE INTESTINAL MALABSORPTION: Primary | ICD-10-CM

## 2021-12-09 PROCEDURE — 99024 POSTOP FOLLOW-UP VISIT: CPT | Performed by: SURGERY

## 2021-12-09 NOTE — PROGRESS NOTES
Bariatric Follow-Up Evaluation    Josh Wakefield is a 40 y.o. white female status post laparoscopic lysis adhesions, left pedicle wedge biopsy, and gastric bypass, partial gastrectomy for a small exophytic gastric mass November 22, 2021. The patient notes appropriate decrease incisional discomfort only requiring analgesics and is otherwise without complaint  Compliant with a early post gastric bypass diet meeting both protein and fluid goals. Patient notes appropriate early satiety with no specific food intolerances or pathologic emesis and is not experience dumping syndrome and she has not attempted high-density carbohydrates  Compliant with multivitamin, calcium citrate, vitamin B12, and vitamin D D supplementation  Activity: Walking 30 to 45 minutes on daily basis  Comorbidities: Gastroesophageal reflux disease, prediabetes, clinical obstructive sleep apnearesolved                          Hypertension, carpal tunnel syndrome, weight related arthropathyimproved                          PCOSwithout change                          Hypercholesterolemianot evaluated  Preoperative weight: 284  Current weight: 268.   BMI: 47  Estimated postsurgical weight loss: 125  Current weight loss: 16  Goal weight: 159  Percentage weight loss: 13% / 17 days    Weight Loss Metrics 12/9/2021 12/9/2021 11/25/2021 11/22/2021 11/16/2021 10/7/2021 10/7/2021   Pre op / Initial Wt 284 - - - - 284 -   Today's Wt - 267 lb 12.8 oz 285 lb - 282 lb - 284 lb   BMI - 47.44 kg/m2 50.49 kg/m2 49.95 kg/m2 - - 50.31 kg/m2   Ideal Body Wt 128 - - - - 128 -   Excess Body Wt 156 - - - - 156 -   Goal Wt 159.2 - - - - 160 -   Wt loss to date 16.2 - - - - 0 -   % Wt Loss 0.13 - - - - 0 -   80% .8 - - - - 124.8 -       Allergies   Allergen Reactions    Onion Anaphylaxis    Eucerin [Mineral Oil-Isopropyl Myristat] Hives    Pcn [Penicillins] Hives    Reglan [Metoclopramide] Hives       Current Outpatient Medications on File Prior to Visit Medication Sig Dispense Refill    enoxaparin (LOVENOX) 40 mg/0.4 mL 0.4 mL by SubCUTAneous route every twenty-four (24) hours. 7 Each 0    ondansetron (ZOFRAN ODT) 4 mg disintegrating tablet Take 1 Tablet by mouth every eight (8) hours as needed for Nausea or Vomiting. 12 Tablet 0    albuterol (PROVENTIL HFA, VENTOLIN HFA, PROAIR HFA) 90 mcg/actuation inhaler Take 2 Puffs by inhalation every four (4) hours as needed for Wheezing.  dextroamphetamine-amphetamine (ADDERALL) 10 mg tablet Take 5 mg by mouth two (2) times a day.  losartan (COZAAR) 50 mg tablet Take 50 mg by mouth daily.  cholecalciferol, vitamin D3, 50 mcg (2,000 unit) tab Take 2,000 Units by mouth daily.  ergocalciferol (ERGOCALCIFEROL) 1,250 mcg (50,000 unit) capsule Take 50,000 Units by mouth every seven (7) days.  multivit with calcium,iron,min [facility] MULTIPLE VITAMINS PO) Take 1 Tablet by mouth daily.  escitalopram oxalate (Lexapro) 10 mg tablet Take 10 mg by mouth daily.  albuterol-ipratropium (DUO-NEB) 2.5 mg-0.5 mg/3 ml nebu USE 3 ML VIA NEBULIZER EVERY 4 HOURS AS NEEDED FOR SHORTNESS OF BREATH      montelukast (SINGULAIR) 10 mg tablet Take 10 mg by mouth nightly.  rizatriptan (MAXALT) 5 mg tablet Take 5 mg by mouth daily as needed.  fexofenadine (ALLEGRA) 180 mg tablet Take 180 mg by mouth daily.  gabapentin (NEURONTIN) 300 mg capsule Take 300 mg by mouth three (3) times daily.  acetaminophen (TylenoL) 325 mg tablet Take  by mouth every four (4) hours as needed for Pain. No current facility-administered medications on file prior to visit.        Past Medical History:   Diagnosis Date    Asthma     Diabetes ([facility] [address]     NIDDM    Hypertension     Multiple gastric ulcers  2015 ,01/2021    Perforated 2015    PCOS (polycystic ovarian syndrome)        Past Surgical History:   Procedure Laterality Date    HX CARPAL TUNNEL RELEASE Bilateral 2017    HX CHOLECYSTECTOMY  2000    HX GI  HX LITHOTRIPSY  2013    RI ABDOMEN SURGERY PROC UNLISTED      Perforated ulcer repair       Social History     Tobacco Use    Smoking status: Former Smoker     Types: Cigarettes     Quit date: 2021     Years since quittin.9    Smokeless tobacco: Never Used   Vaping Use    Vaping Use: Never used   Substance Use Topics    Alcohol use: Yes     Comment: occ    Drug use: Never       Family History   Problem Relation Age of Onset    Heart Disease Mother     Hypertension Mother     Diabetes Mother     Dementia Mother     Stroke Mother     Heart Disease Father     Heart Failure Father     Kidney Disease Father        ROS:  General: Negative for fevers, chills, night sweats, fatigue, weight loss, or weight gain. GI: Negative for abdominal pain, change in bowel habits, hematochezia, melena, or GERD. Integumentary: Negative for dermatitis or abnormal moles. HEENT: Negative for visual changes, vertigo, epistaxis, dysphagia, or hoarseness    Cardiac: Negative for chest pain, palpitations, hypertension, edema, or varicosities    Resp: Negative for cough, shortness of breath, wheezing, hemoptysis, snoring, or reactive airway disease    : Negative for hematuria, dysuria, frequency, urgency, nocturia, or stress urinary incontinence    MSK:  Negative for weakness, joint pain, or arthritis    Endocrine: Negative for diabetes, thyroid disease, polyuria, polydipsia, polyphagia, poor wound, heat intolerance, or cold intolerance. Lymph/Heme: Negative for anemia, bruising, or history of blood transfusions. Neuro:  Negative for dizziness, headache, fainting, seizures, and stroke. Psychiatry:  Negative for anxiety or depression    Physical Exam    Visit Vitals  BP (!) 141/85   Pulse 80   Temp 98 °F (36.7 °C) (Temporal)   Ht 5' 3\" (1.6 m)   Wt 121.5 kg (267 lb 12.8 oz)   LMP  (LMP Unknown)   SpO2 95%   BMI 47.44 kg/m²       Nursing note reviewed.      General: 40 y.o. female is in no acute distress. Head: Normocephalic, atraumatic  Resp: Clear to auscultation bilaterally, no wheezing, rhonchi, or rales, excursions normal and symmetrical.  Cardio: Regular rate and rhythm, no murmurs, clicks, gallops, or rubs. No edema or varicosities. Abdomen: Obese, soft, nontender, nondistended, normoactive bowel sounds, no hernias, no hepatosplenomegaly, wounds clean dry approximated with appropriate surrounding induration incisional tenderness without evidence of infectious complications or incisional hernia  Psych: Alert and oriented to person, place, and time. Pathology:  1. Liver biopsy: Mild steatosis  2. Partial gastrectomylow-grade GI stromal tumor with negative margins    Impression    1. Status post laparoscopic gastric bypass November 22, 2021 with appropriate weight loss and comorbidity resolution  2. Gastrointestinal stromal tumor      Plan    1. Formal bariatric nutrition evaluation for assistance with advancement of diet  Continue compliance with early post gastric bypass diet, exercise regimen, vitamin supplementation protocol, encourage monthly attendance at bariatric support group meetings  Follow-up February 2021 with 3-month labs for ongoing bariatric surgical evaluation  2.   Oncology consultation in regard to follow-up and potential further assistance in regard to the GI stromal tumor

## 2021-12-09 NOTE — PROGRESS NOTES
Lissette Colorado is a 40 y.o. female (: 1975) presenting to address:    Chief Complaint   Patient presents with    Surgical Follow-up     LGBP and left hepatic lobe wedge biopsy and wedge resection of 3 cm gastric mass 21       Medication list and allergies have been reviewed with Lissette Colorado and updated as of today's date. I have gone over all Medical, Surgical and Social History with Lissette Colorado and updated/added the information accordingly. 1. Have you been to the ER, Urgent Care or Hospitalized since your last visit? YES. Singh elise 21      2. Have you followed up with your PCP or any other Physicians since your procedure/ last office visit?    NO

## 2022-01-04 ENCOUNTER — DOCUMENTATION ONLY (OUTPATIENT)
Dept: BARIATRICS/WEIGHT MGMT | Age: 47
End: 2022-01-04

## 2022-01-04 ENCOUNTER — HOSPITAL ENCOUNTER (OUTPATIENT)
Dept: BARIATRICS/WEIGHT MGMT | Age: 47
Discharge: HOME | End: 2022-01-04

## 2022-01-04 NOTE — PROGRESS NOTES
[facility]  POST-OP NUTRITION FOLLOW UP    Patient's Name: Nathen Martinez  YOB: 1975  Surgery Date: 2021      Procedure: Gastric Bypass   Surgeon: Dr Jethro Blanco    Height: 63 inches     Pre-Op Weight: 283    Current Weight: 256 lbs  Weight Lost: 27    BMI: 45.4    Attendance of support group: send link    Complications  None, except after surgery one of the incisions was bleeding. IV due to being dehydrated    Problem Areas:   Nausea: always after first bite or second bite  Vomiting: leads to vomiting   Inadequate Protein: trying to increase with foods she can tolerate  Food Intolerance: cottage cheese, tuna fish chicken premier shakes  Constipation: none    Eating 3 Meals/Day: 3  + 2-3 snack protein piece of turkey mainly protein  Portion Size at Meals: 1  oz    Protein from Food: 8-12 grams for whole day    Foods being consumed:  Breakfast: Time: 7:30 - 8:30 am egg scrambled cheese, or regular scrambled/ 1/2 boiled egg  Lunch: Time: 11:30 - 12:30  Tuna or chicken , or turkey all on a wrap sometimes chicken by itself  Dinner:  Time: 6-7 pm steak, small does not like smell but someone cooking it is ok or turkey burger without bread. She cuts up small pieces  In-between eating: if feeling hungary has snack later or the rest of the meal. Eats off a baby spoon. Length of time for meals: 30 minutes    food/fluids: yes    Fluids : 60-64 oz/day   Types of Fluids: Water, sometimes juice (apple juice) may be once a week. MVI: Bariat[employer] on order    Number/Day: 1     Vitamin D3   Dosin mcg    Calcium:    Calcium Dosin times per day (1 2 + 1 )     Iron: border line anemic   Iron dosing: taking 2 times a day     Protein Supplement: experimenting       Exercise: Walking, going to start water aerobics     Comments:    Patient was educated on the importance of eating meat and vegetables only.   I have talked with patient about the effects of carbohydrates, not only from a weight management perspective, but also what effects it could have on their blood sugar and what reactive hypoglycemia is.         Diet Follow Up:  9 month class scheduled for: August 19, 2022    Alexander Weir RDN    1/4/2022

## 2022-02-09 ENCOUNTER — HOSPITAL ENCOUNTER (OUTPATIENT)
Dept: LAB | Age: 47
Discharge: HOME | End: 2022-02-09
Payer: OTHER GOVERNMENT

## 2022-02-09 LAB
ALBUMIN SERPL-MCNC: 3.7 G/DL (ref 3.4–5)
ERYTHROCYTE [DISTWIDTH] IN BLOOD BY AUTOMATED COUNT: 14.6 % (ref 11.6–14.5)
HBA1C MFR BLD: 5.2 % (ref 4.2–5.6)
HCT VFR BLD AUTO: 46.2 % (ref 35–45)
HGB BLD-MCNC: 13.9 G/DL (ref 12–16)
IRON SERPL-MCNC: 41 UG/DL (ref 50–175)
MCH RBC QN AUTO: 24.7 PG (ref 24–34)
MCHC RBC AUTO-ENTMCNC: 30.1 G/DL (ref 31–37)
MCV RBC AUTO: 82.1 FL (ref 78–100)
NRBC # BLD: 0 K/UL (ref 0–0.01)
NRBC BLD-RTO: 0 PER 100 WBC
PLATELET # BLD AUTO: 373 K/UL (ref 135–420)
PMV BLD AUTO: 11.5 FL (ref 9.2–11.8)
RBC # BLD AUTO: 5.63 M/UL (ref 4.2–5.3)
WBC # BLD AUTO: 13 K/UL (ref 4.6–13.2)

## 2022-02-09 PROCEDURE — 36415 COLL VENOUS BLD VENIPUNCTURE: CPT

## 2022-02-09 PROCEDURE — 84425 ASSAY OF VITAMIN B-1: CPT

## 2022-02-09 PROCEDURE — 83540 ASSAY OF IRON: CPT

## 2022-02-09 PROCEDURE — 83036 HEMOGLOBIN GLYCOSYLATED A1C: CPT

## 2022-02-09 PROCEDURE — 82040 ASSAY OF SERUM ALBUMIN: CPT

## 2022-02-09 PROCEDURE — 85027 COMPLETE CBC AUTOMATED: CPT

## 2022-02-10 ENCOUNTER — TELEPHONE (OUTPATIENT)
Dept: SURGERY | Age: 47
End: 2022-02-10

## 2022-02-10 NOTE — TELEPHONE ENCOUNTER
Spoke to pt she has 65 milligram iron and will take daily with 500 milligram vit c chewable 30 min before

## 2022-02-22 ENCOUNTER — OFFICE VISIT (OUTPATIENT)
Dept: SURGERY | Age: 47
End: 2022-02-22
Payer: OTHER GOVERNMENT

## 2022-02-22 VITALS
BODY MASS INDEX: 42.52 KG/M2 | OXYGEN SATURATION: 98 % | WEIGHT: 240 LBS | SYSTOLIC BLOOD PRESSURE: 150 MMHG | DIASTOLIC BLOOD PRESSURE: 100 MMHG | TEMPERATURE: 98.1 F | HEART RATE: 70 BPM | HEIGHT: 63 IN

## 2022-02-22 DIAGNOSIS — K91.2 POST-RESECTION MALABSORPTION: Primary | ICD-10-CM

## 2022-02-22 DIAGNOSIS — E66.01 MORBID OBESITY (HCC): ICD-10-CM

## 2022-02-22 DIAGNOSIS — Z98.84 S/P GASTRIC BYPASS: ICD-10-CM

## 2022-02-22 DIAGNOSIS — R10.13 EPIGASTRIC PAIN: ICD-10-CM

## 2022-02-22 DIAGNOSIS — L98.7 EXCESSIVE AND REDUNDANT SKIN AND SUBCUTANEOUS TISSUE: ICD-10-CM

## 2022-02-22 PROCEDURE — 99213 OFFICE O/P EST LOW 20 MIN: CPT | Performed by: NURSE PRACTITIONER

## 2022-02-22 RX ORDER — LANOLIN ALCOHOL/MO/W.PET/CERES
CREAM (GRAM) TOPICAL
COMMUNITY

## 2022-02-22 RX ORDER — SUCRALFATE 1 G/10ML
1 SUSPENSION ORAL 4 TIMES DAILY
Qty: 420 ML | Refills: 2 | Status: SHIPPED | OUTPATIENT
Start: 2022-02-22 | End: 2022-05-24 | Stop reason: SDUPTHER

## 2022-02-22 RX ORDER — LANOLIN ALCOHOL/MO/W.PET/CERES
500 CREAM (GRAM) TOPICAL DAILY
COMMUNITY

## 2022-02-22 RX ORDER — NYSTATIN 100000 [USP'U]/G
POWDER TOPICAL 4 TIMES DAILY
Qty: 1 EACH | Refills: 2 | Status: SHIPPED | OUTPATIENT
Start: 2022-02-22 | End: 2022-03-24 | Stop reason: ALTCHOICE

## 2022-02-22 RX ORDER — IBUPROFEN 200 MG
CAPSULE ORAL DAILY
COMMUNITY

## 2022-02-22 RX ORDER — OMEPRAZOLE 40 MG/1
40 CAPSULE, DELAYED RELEASE ORAL DAILY
Qty: 90 CAPSULE | Refills: 0 | Status: SHIPPED | OUTPATIENT
Start: 2022-02-22 | End: 2022-05-24 | Stop reason: SDUPTHER

## 2022-02-22 RX ORDER — ASCORBIC ACID 500 MG
TABLET ORAL
COMMUNITY

## 2022-02-22 NOTE — PROGRESS NOTES
Bariatric Postoperative Progress Note    CC: 3 Month LGBP Follow Up    Harpreet Youssef is a 40 y.o. female now 3 months status post laparoscopic gastric bypass surgery performed on 11/22/21. Doing well overall. Currently eating fish, chicken, eggs, tuna, seafood, spinach, caarrots, squash, broccoli, mushrooms, cheese, cashews, occ pasta, soda. Taking in 65 oz water,  60 g protein. 30 min of walking 5 days a week. Bowel movements are regular. The patient is not having any pain. She is compliant with multivitamins, calcium, Vit D and B12 supplements. No dumping symptoms but still feels restriction. Reporting epigastric pain started 4 days ago with any PO intake that will last for an hour. She has a hx of peptic ulcers and reports this pain feels similar. Experiencing itching and rashes under pannus. Denies any NSAID, ETOH, or tobacco use.      Weight Loss Metrics 2/22/2022 2/22/2022 12/9/2021 12/9/2021 11/25/2021 11/22/2021 11/16/2021   Pre op / Initial Wt 284 - 284 - - - -   Today's Wt - 240 lb - 267 lb 12.8 oz 285 lb - 282 lb   BMI - 42.51 kg/m2 - 47.44 kg/m2 50.49 kg/m2 49.95 kg/m2 -   Ideal Body Wt 128 - 128 - - - -   Excess Body Wt 156 - 156 - - - -   Goal Wt 159 - 159.2 - - - -   Wt loss to date 44 - 16.2 - - - -   % Wt Loss 0.35 - 0.13 - - - -   80% .8 - 124.8 - - - -     Comorbidities:  Hypertension: improved  Diabetes: not applicable  Obstructive Sleep Apnea: resolved  Hyperlipidemia: not applicable  Stress Urinary Incontinence: not applicable  Gastroesophageal Reflux: improved  Weight related arthropathy:improved        Past Medical History:   Diagnosis Date    Asthma     Diabetes ([address]     NIDDM    Hypertension     Multiple gastric ulcers  2015 ,01/2021    Perforated 2015    PCOS (polycystic ovarian syndrome)        Past Surgical History:   Procedure Laterality Date    HX CARPAL TUNNEL RELEASE Bilateral 2017    HX CHOLECYSTECTOMY  2000    HX GI      HX LITHOTRIPSY  2013    [place] ABDOMEN SURGERY PROC UNLISTED  2015    Perforated ulcer repair       Current Outpatient Medications   Medication Sig Dispense Refill    calcium citrate 200 mg (950 mg) tablet Take  by mouth daily.  cyanocobalamin (VITAMIN B12) 500 mcg tablet Take 500 mcg by mouth daily.  ferrous sulfate (Iron) 325 mg (65 mg iron) tablet Take  by mouth Daily (before breakfast).  ascorbic acid, vitamin C, (Vitamin C) 500 mg tablet Take  by mouth.  nystatin (MYCOSTATIN) powder Apply  to affected area four (4) times daily. 1 Each 2    omeprazole (PRILOSEC) 40 mg capsule Take 1 Capsule by mouth daily. Can open capsule and sprinkle in yogurt (do not use applesauce) 90 Capsule 0    sucralfate (CARAFATE) 100 mg/mL suspension Take 10 mL by mouth four (4) times daily. 420 mL 2    dextroamphetamine-amphetamine (ADDERALL) 10 mg tablet Take 5 mg by mouth two (2) times a day.  losartan (COZAAR) 50 mg tablet Take 50 mg by mouth daily.  cholecalciferol, vitamin D3, 50 mcg (2,000 unit) tab Take 2,000 Units by mouth daily.  multivit with calcium,iron,min [facility] MULTIPLE VITAMINS PO) Take 1 Tablet by mouth daily.  escitalopram oxalate (Lexapro) 10 mg tablet Take 10 mg by mouth daily.  montelukast (SINGULAIR) 10 mg tablet Take 10 mg by mouth nightly.  fexofenadine (ALLEGRA) 180 mg tablet Take 180 mg by mouth daily.  albuterol (PROVENTIL HFA, VENTOLIN HFA, PROAIR HFA) 90 mcg/actuation inhaler Take 2 Puffs by inhalation every four (4) hours as needed for Wheezing. (Patient not taking: Reported on 2/22/2022)      albuterol-ipratropium (DUO-NEB) 2.5 mg-0.5 mg/3 ml nebu USE 3 ML VIA NEBULIZER EVERY 4 HOURS AS NEEDED FOR SHORTNESS OF BREATH (Patient not taking: USE 3 ML VIA NEBULIZER EVERY 4 HOURS AS NEEDED FOR SHORTNESS OF BREATH)      acetaminophen (TylenoL) 325 mg tablet Take  by mouth every four (4) hours as needed for Pain.  (Patient not taking: Reported on 2/22/2022)         Allergies Allergen Reactions    Onion Anaphylaxis    Eucerin [Mineral Oil-Isopropyl Myristat] Hives    Pcn [Penicillins] Hives    Reglan [Metoclopramide] Hives       ROS:  Review of Systems   Constitutional: Positive for weight loss. Negative for malaise/fatigue. Eyes:        Decrease in night vision   Respiratory: Negative. Cardiovascular: Negative for chest pain and palpitations. Gastrointestinal: Positive for abdominal pain and nausea. Negative for blood in stool, constipation, diarrhea, heartburn, melena and vomiting. Genitourinary: Negative. Skin: Positive for itching and rash. Itching, rashes, and skin irritation under pannus. Neurological: Positive for dizziness (orthostatic). Negative for tingling, loss of consciousness, weakness and headaches. Physicial Exam:  Visit Vitals  BP (!) 150/100 (BP 1 Location: Left upper arm, BP Patient Position: Sitting)   Pulse 70   Temp 98.1 °F (36.7 °C)   Ht 5' 3\" (1.6 m)   Wt 108.9 kg (240 lb)   SpO2 98%   BMI 42.51 kg/m²     Physical Exam  Vitals and nursing note reviewed. Constitutional:       Appearance: She is obese. HENT:      Head: Normocephalic and atraumatic. Cardiovascular:      Rate and Rhythm: Normal rate. Pulses: Normal pulses. Heart sounds: Normal heart sounds. Pulmonary:      Effort: Pulmonary effort is normal.      Breath sounds: Normal breath sounds. Abdominal:      General: Bowel sounds are normal. There is no distension. Palpations: Abdomen is soft. There is no mass. Tenderness: There is abdominal tenderness (epigastric). Hernia: No hernia is present. Musculoskeletal:         General: Normal range of motion. Skin:     General: Skin is warm and dry. Comments: Hyperpigmentation and erythema under pannus from excess skin chafing. Neurological:      General: No focal deficit present. Mental Status: She is alert and oriented to person, place, and time.    Psychiatric:         Mood and Affect: Mood normal.         Behavior: Behavior normal.         Labs:   Recent Results (from the past 672 hour(s))   ALBUMIN    Collection Time: 02/09/22  8:09 AM   Result Value Ref Range    Albumin 3.7 3.4 - 5.0 g/dL   CBC W/O DIFF    Collection Time: 02/09/22  8:09 AM   Result Value Ref Range    WBC 13.0 4.6 - 13.2 K/uL    RBC 5.63 (H) 4.20 - 5.30 M/uL    HGB 13.9 12.0 - 16.0 g/dL    HCT 46.2 (H) 35.0 - 45.0 %    MCV 82.1 78.0 - 100.0 FL    MCH 24.7 24.0 - 34.0 PG    MCHC 30.1 (L) 31.0 - 37.0 g/dL    RDW 14.6 (H) 11.6 - 14.5 %    PLATELET 550 090 - 105 K/uL    MPV 11.5 9.2 - 11.8 FL    NRBC 0.0 0  WBC    ABSOLUTE NRBC 0.00 0.00 - 0.01 K/uL   IRON    Collection Time: 02/09/22  8:09 AM   Result Value Ref Range    Iron 41 (L) 50 - 175 ug/dL   HEMOGLOBIN A1C W/O EAG    Collection Time: 02/09/22  8:10 AM   Result Value Ref Range    Hemoglobin A1c 5.2 4.2 - 5.6 %       Assessment/Plan:   She is currently 3 months s/p laparoscopic gastric bypass surgery with a total weight loss of 44 lbs to date, doing well. Labs were reviewed and pt was instructed to continue MVI/B1/B12/D supplementation. She started taking iron after nurse called earlier this month. Epigastric pain most likely gastritis, will prescribe omeprazole 40 mg daily with prn Carafate. Nystatin for skin irritation under pannus. Discussed use of support garments and skin care such as keeping area clean and dry. Vitamin A for decrease in night vision 10,000-25,000 units daily until symptoms improve or for 2 weeks per ASMBS guidelines. We have reviewed the components of a successful postoperative course including requirement for a high protein, low carbohydrate diet, 64 oz a day of zero calorie liquids, daily vitamin supplementation, daily exercise (150 mis/week), regular follow-up, and participation in support groups. Refer to registered dietitian for more detailed medical nutrition therapy as needed.      The primary encounter diagnosis was Post-resection malabsorption. Diagnoses of S/P gastric bypass, Morbid obesity ([address] BMI 40.0-44.9, adult ([address] Epigastric pain, and Excessive and redundant skin and subcutaneous tissue were also pertinent to this visit. Follow-up and Dispositions    · Return in about 4 weeks (around 3/22/2022) for Symptom follow up.     sooner as needed.   Lesia Crigler, NP

## 2022-02-25 LAB — VIT B1 BLD-SCNC: 90.7 NMOL/L (ref 66.5–200)

## 2022-03-19 PROBLEM — R89.9 ABNORMAL LABORATORY TEST: Status: ACTIVE | Noted: 2021-11-25

## 2022-03-19 PROBLEM — E66.01 MORBID OBESITY WITH BMI OF 45.0-49.9, ADULT (HCC): Status: ACTIVE | Noted: 2021-11-22

## 2022-03-24 ENCOUNTER — OFFICE VISIT (OUTPATIENT)
Dept: SURGERY | Age: 47
End: 2022-03-24
Payer: OTHER GOVERNMENT

## 2022-03-24 VITALS
DIASTOLIC BLOOD PRESSURE: 90 MMHG | TEMPERATURE: 98.1 F | HEART RATE: 68 BPM | WEIGHT: 230 LBS | SYSTOLIC BLOOD PRESSURE: 160 MMHG | HEIGHT: 63 IN | OXYGEN SATURATION: 97 % | BODY MASS INDEX: 40.75 KG/M2

## 2022-03-24 DIAGNOSIS — Z98.84 S/P GASTRIC BYPASS: ICD-10-CM

## 2022-03-24 DIAGNOSIS — E66.01 MORBID OBESITY (HCC): ICD-10-CM

## 2022-03-24 DIAGNOSIS — K91.2 POST-RESECTION MALABSORPTION: Primary | ICD-10-CM

## 2022-03-24 DIAGNOSIS — R10.13 EPIGASTRIC PAIN: ICD-10-CM

## 2022-03-24 DIAGNOSIS — L98.7 EXCESSIVE AND REDUNDANT SKIN AND SUBCUTANEOUS TISSUE: ICD-10-CM

## 2022-03-24 PROCEDURE — 99213 OFFICE O/P EST LOW 20 MIN: CPT | Performed by: NURSE PRACTITIONER

## 2022-03-24 RX ORDER — METOPROLOL SUCCINATE 50 MG/1
50 TABLET, EXTENDED RELEASE ORAL DAILY
COMMUNITY

## 2022-03-24 RX ORDER — NYSTATIN 100000 [USP'U]/G
POWDER TOPICAL 4 TIMES DAILY
Qty: 1 EACH | Refills: 2 | Status: SHIPPED | OUTPATIENT
Start: 2022-03-24 | End: 2022-06-29

## 2022-03-24 NOTE — PROGRESS NOTES
Bariatric Postoperative Progress Note    CC: Symptom Follow Up    Isabell Butler is a 40 y.o. female now 3 months status post laparoscopic gastric bypass surgery performed on 11/22/21. Doing well overall. Currently eating chicken, fish, eggs, string cheese, cottage cheese, avocado , yogurt, carrots, broccoli, spinach, cauliflower, squash, zucchini, blueberries, watermelon. Taking in 70 oz water,  80-90 g protein. 45-60 min of activity 4 days a week. Bowel movements are regular. The patient is not having any pain. . She is compliant with multivitamins, calcium, Vit D and B12 supplements. Epigastric pain has improved with omeprazole and carafate but still experiencing it. Nystatin has helped with itching and skin irritation under pannus, requesting refill. Having trouble getting in with PCP for HTN follow up. She is taking her BP at home and it is running [id number]    Weight Loss Metrics 3/24/2022 3/24/2022 2/22/2022 2/22/2022 12/9/2021 12/9/2021 11/25/2021   Pre op / Initial Wt 284 - 284 - 284 - -   Today's Wt - 230 lb - 240 lb - 267 lb 12.8 oz 285 lb   BMI - 40.74 kg/m2 - 42.51 kg/m2 - 47.44 kg/m2 50.49 kg/m2   Ideal Body Wt 128 - 128 - 128 - -   Excess Body Wt 156 - 156 - 156 - -   Goal Wt 159 - 159 - 159.2 - -   Wt loss to date 54 - 44 - 16.2 - -   % Wt Loss 0.43 - 0.35 - 0.13 - -   80% .8 - 124.8 - 124.8 - -     Past Medical History:   Diagnosis Date    Asthma     Diabetes ([facility] [address]     NIDDM    Hypertension     Multiple gastric ulcers  2015 ,01/2021    Perforated 2015    PCOS (polycystic ovarian syndrome)        Past Surgical History:   Procedure Laterality Date    HX CARPAL TUNNEL RELEASE Bilateral 2017    HX CHOLECYSTECTOMY  2000    HX GI      HX LITHOTRIPSY  2013    [place] ABDOMEN SURGERY PROC UNLISTED  2015    Perforated ulcer repair       Current Outpatient Medications   Medication Sig Dispense Refill    BIOTIN PO Take  by mouth.       nystatin (MYCOSTATIN) powder Apply  to affected area four (4) times daily. 1 Each 2    metoprolol succinate (Toprol XL) 50 mg XL tablet Take 50 mg by mouth daily.  calcium citrate 200 mg (950 mg) tablet Take  by mouth daily.  cyanocobalamin (VITAMIN B12) 500 mcg tablet Take 500 mcg by mouth daily.  ferrous sulfate (Iron) 325 mg (65 mg iron) tablet Take  by mouth Daily (before breakfast).  ascorbic acid, vitamin C, (Vitamin C) 500 mg tablet Take  by mouth.  omeprazole (PRILOSEC) 40 mg capsule Take 1 Capsule by mouth daily. Can open capsule and sprinkle in yogurt (do not use applesauce) 90 Capsule 0    sucralfate (CARAFATE) 100 mg/mL suspension Take 10 mL by mouth four (4) times daily. 420 mL 2    dextroamphetamine-amphetamine (ADDERALL) 10 mg tablet Take 5 mg by mouth two (2) times a day.  losartan (COZAAR) 50 mg tablet Take 50 mg by mouth daily.  cholecalciferol, vitamin D3, 50 mcg (2,000 unit) tab Take 2,000 Units by mouth daily.  multivit with calcium,iron,min [facility] MULTIPLE VITAMINS PO) Take 1 Tablet by mouth daily.  escitalopram oxalate (Lexapro) 10 mg tablet Take 10 mg by mouth daily.  montelukast (SINGULAIR) 10 mg tablet Take 10 mg by mouth nightly.  albuterol (PROVENTIL HFA, VENTOLIN HFA, PROAIR HFA) 90 mcg/actuation inhaler Take 2 Puffs by inhalation every four (4) hours as needed for Wheezing. (Patient not taking: Reported on 2/22/2022)      fexofenadine (ALLEGRA) 180 mg tablet Take 180 mg by mouth daily. (Patient not taking: Reported on 3/24/2022)      acetaminophen (TylenoL) 325 mg tablet Take  by mouth every four (4) hours as needed for Pain. (Patient not taking: Reported on 2/22/2022)         Allergies   Allergen Reactions    Onion Anaphylaxis    Eucerin [Mineral Oil-Isopropyl Myristat] Hives    Pcn [Penicillins] Hives    Reglan [Metoclopramide] Hives       ROS:  Review of Systems   Constitutional: Positive for malaise/fatigue and weight loss.    Cardiovascular: Negative for chest pain. Gastrointestinal: Positive for abdominal pain. Negative for heartburn, nausea and vomiting. Genitourinary: Positive for dysuria. Passed kidney stone 2 weeks   Skin: Positive for itching and rash. Itching and skin irritation under pannus from excess skin chafing. Neurological: Negative. Physicial Exam:  Visit Vitals  BP (!) 160/90 (BP 1 Location: Right arm, BP Patient Position: Sitting)   Pulse 68   Temp 98.1 °F (36.7 °C)   Ht 5' 3\" (1.6 m)   Wt 104.3 kg (230 lb)   SpO2 97%   BMI 40.74 kg/m²     Physical Exam  Vitals and nursing note reviewed. Constitutional:       Appearance: She is obese. HENT:      Head: Normocephalic and atraumatic. Cardiovascular:      Rate and Rhythm: Normal rate. Pulmonary:      Effort: Pulmonary effort is normal.   Musculoskeletal:         General: Normal range of motion. Neurological:      General: No focal deficit present. Mental Status: She is alert and oriented to person, place, and time. Psychiatric:         Mood and Affect: Mood normal.         Behavior: Behavior normal.         Labs:   No results found for this or any previous visit (from the past 672 hour(s)). Assessment/Plan:   She is currently 4 months s/p laparoscopic gastric bypass surgery with a total weight loss of 54 lbs to date  Will decrease calcium to BID instead of TID due to kidney stones. Refill Nystatin  Restart metoprolol, follow up with PCP for HTN, continue taking BP at home. Would recommend referral to GI for ongoing epigastric pain with minor improvement on PPI and carafate. She has [address], she will work with PCP to coordinate referral to GI. We have reviewed the components of a successful postoperative course including requirement for a high protein, low carbohydrate diet, 64 oz a day of zero calorie liquids, daily vitamin supplementation, daily exercise (150 mis/week), regular follow-up, and participation in support groups.     Refer to registered dietitian for more detailed medical nutrition therapy as needed. The primary encounter diagnosis was Post-resection malabsorption. Diagnoses of S/P gastric bypass, Morbid obesity ([address] BMI 40.0-44.9, adult ([address] Epigastric pain, and Excessive and redundant skin and subcutaneous tissue were also pertinent to this visit. Follow-up and Dispositions    · Return in about 2 months (around 5/24/2022) for 6 Month LGBP Follow Up.       with labs, sooner as needed.   Sree Wright, ADAN

## 2022-03-24 NOTE — PATIENT INSTRUCTIONS
[facility] [address] Page 1 of 1   Req/Control #: [id number] [employer] Unless Marked - N5788M    Collection D/T:   Client Moriah Tamayo [ ] - [id number]    Collected By:    [employer] [ ] - [id number]         Office/Provider Information    Account Name:    Client ID: [id number]          [facility] ID: [id number]  [address]                              Address:  [address] [place] [address]  Phone: [phone]  Fax: [phone] Provider:           Ady Hillman NP     Provider NPI:   [id number]            Patient Information:   Name: Elsie Turcios   Gender: Female   SSN: [id number]   Patient ID: [id number]    YOB: 1975 (46 years)   Phone: [phone]   Address: [address] [place] [address]            CPT CODE TESTS ORDERED (Total: 7) PRIORITY ORDERED EXPECTED EXPIRES          [ ] BCF - Add Blood Collection Fee       90011 CBCNR CBC W/O DIFF Routine 2/22/2022 2/23/2022 8/22/2022   06436 FERR FERRITIN Routine 2/22/2022 2/22/2022 2/23/2023   91415 IRN IRON Routine 2/22/2022 2/22/2022 2/23/2023   81844 MP METABOLIC PANEL, COMPREHENSIVE Routine 2/22/2022 2/22/2022 2/23/2023   19950 VIB1L VITAMIN B1, WHOLE BLOOD Routine 2/22/2022 2/22/2022 2/23/2023   04591 B12FO VITAMIN B12 & FOLATE Routine 2/22/2022 2/22/2022 2/23/2023   05196 VITD VITAMIN D, 25 HYDROXY Routine 2/22/2022 2/22/2022 2/23/2023         Comments:         Specimen Type:   (600997) CBC W/O DIFF:   Whole Blood    (460782) FERRITIN:   Serum      (654933) IRON:   Serum    (191369) METABOLIC PANEL, COMPREHENSIVE:   Serum      (716881) VITAMIN B1, WHOLE BLOOD:   Blood    (489903) VITAMIN B12 & FOLATE:   Serum      (942260) VITAMIN D, 25 HYDROXY:   Serum             Diagnosis Codes:   K91.2 Z98.84 E66.01 Z68.41 R10.13 L98. 7       Bill Type:      Ins Code: Not on file               Responsible Party / Guarantor Information:   Name: [facility]   Address: [place], [place] Zip: [address] [place]   Phone: [phone]   Relation to Pt: Self   Employer Name: NOT EMPLOYED         ABN:      Worker's Comp: N Date of Injury:              Insurance Information:   Primary Insurance: Secondary Insurance:   Ins Co Name: [address]   Address 1: [address]   Address 2: [address] [place] [address]   Policy Number: [id number]   Group #:      Ins Co Name: Shawna Oseasimo 6   Address 1: [address] [address]   Address 2: [address] [place] [address], [address] [place] [address]   Policy Number: [id number]   Group #:        Primary Policy Mack / Insured: Secondary Policy Shubham Chasten / Insured:   Name: [facility]   Address: [address] [place] [address], [address]   Pt Relation to Subscriber: Self    Name: [facility]   Address: [address] [place] [address], [address] [place] [address] [address]   Pt Relation to Subscriber: Self                  Order Providers    Authorizing Provider Encounter Provider   Link Shoulder, NP Link Shoulder, [address]   1975 [phone]    Nichole Giraldo [id number]    Nichole Giraldo [phone]    [facility]   1975 [id number]      [facility]   1975 [id number]    [facility]   1975 [id number]    [facility]   1975 [id number]    [facility]   [phone]

## 2022-05-18 ENCOUNTER — HOSPITAL ENCOUNTER (OUTPATIENT)
Dept: LAB | Age: 47
Discharge: HOME | End: 2022-05-18
Payer: OTHER GOVERNMENT

## 2022-05-18 DIAGNOSIS — K91.2 POST-RESECTION MALABSORPTION: ICD-10-CM

## 2022-05-18 DIAGNOSIS — Z98.84 S/P GASTRIC BYPASS: ICD-10-CM

## 2022-05-18 DIAGNOSIS — R10.13 EPIGASTRIC PAIN: ICD-10-CM

## 2022-05-18 DIAGNOSIS — L98.7 EXCESSIVE AND REDUNDANT SKIN AND SUBCUTANEOUS TISSUE: ICD-10-CM

## 2022-05-18 DIAGNOSIS — E66.01 MORBID OBESITY (HCC): ICD-10-CM

## 2022-05-18 LAB
25(OH)D3 SERPL-MCNC: 24.8 NG/ML (ref 30–100)
ALBUMIN SERPL-MCNC: 3.7 G/DL (ref 3.4–5)
ALBUMIN/GLOB SERPL: 1.1 {RATIO} (ref 0.8–1.7)
ALP SERPL-CCNC: 104 U/L (ref 45–117)
ALT SERPL-CCNC: 31 U/L (ref 13–56)
ANION GAP SERPL CALC-SCNC: 6 MMOL/L (ref 3–18)
AST SERPL-CCNC: 21 U/L (ref 10–38)
BILIRUB SERPL-MCNC: 0.5 MG/DL (ref 0.2–1)
BUN SERPL-MCNC: 15 MG/DL (ref 7–18)
BUN/CREAT SERPL: 15 (ref 12–20)
CALCIUM SERPL-MCNC: 8.9 MG/DL (ref 8.5–10.1)
CHLORIDE SERPL-SCNC: 109 MMOL/L (ref 100–111)
CO2 SERPL-SCNC: 26 MMOL/L (ref 21–32)
CREAT SERPL-MCNC: 1 MG/DL (ref 0.6–1.3)
ERYTHROCYTE [DISTWIDTH] IN BLOOD BY AUTOMATED COUNT: 15.6 % (ref 11.6–14.5)
FERRITIN SERPL-MCNC: 44 NG/ML (ref 8–388)
FOLATE SERPL-MCNC: 5.7 NG/ML (ref 3.1–17.5)
GLOBULIN SER CALC-MCNC: 3.5 G/DL (ref 2–4)
GLUCOSE SERPL-MCNC: 74 MG/DL (ref 74–99)
HCT VFR BLD AUTO: 47.1 % (ref 35–45)
HGB BLD-MCNC: 14.4 G/DL (ref 12–16)
IRON SERPL-MCNC: 52 UG/DL (ref 50–175)
MCH RBC QN AUTO: 25.7 PG (ref 24–34)
MCHC RBC AUTO-ENTMCNC: 30.6 G/DL (ref 31–37)
MCV RBC AUTO: 84.1 FL (ref 78–100)
NRBC # BLD: 0 K/UL (ref 0–0.01)
NRBC BLD-RTO: 0 PER 100 WBC
PLATELET # BLD AUTO: 367 K/UL (ref 135–420)
PMV BLD AUTO: 11.2 FL (ref 9.2–11.8)
POTASSIUM SERPL-SCNC: 4.7 MMOL/L (ref 3.5–5.5)
PROT SERPL-MCNC: 7.2 G/DL (ref 6.4–8.2)
RBC # BLD AUTO: 5.6 M/UL (ref 4.2–5.3)
SODIUM SERPL-SCNC: 141 MMOL/L (ref 136–145)
VIT B12 SERPL-MCNC: 270 PG/ML (ref 211–911)
WBC # BLD AUTO: 11.5 K/UL (ref 4.6–13.2)

## 2022-05-18 PROCEDURE — 82728 ASSAY OF FERRITIN: CPT

## 2022-05-18 PROCEDURE — 84425 ASSAY OF VITAMIN B-1: CPT

## 2022-05-18 PROCEDURE — 83540 ASSAY OF IRON: CPT

## 2022-05-18 PROCEDURE — 85027 COMPLETE CBC AUTOMATED: CPT

## 2022-05-18 PROCEDURE — 36415 COLL VENOUS BLD VENIPUNCTURE: CPT

## 2022-05-18 PROCEDURE — 82607 VITAMIN B-12: CPT

## 2022-05-18 PROCEDURE — 80053 COMPREHEN METABOLIC PANEL: CPT

## 2022-05-18 PROCEDURE — 82306 VITAMIN D 25 HYDROXY: CPT

## 2022-05-24 ENCOUNTER — OFFICE VISIT (OUTPATIENT)
Dept: SURGERY | Age: 47
End: 2022-05-24
Payer: OTHER GOVERNMENT

## 2022-05-24 VITALS
DIASTOLIC BLOOD PRESSURE: 102 MMHG | BODY MASS INDEX: 37.39 KG/M2 | HEIGHT: 63 IN | TEMPERATURE: 97 F | SYSTOLIC BLOOD PRESSURE: 164 MMHG | WEIGHT: 211 LBS | OXYGEN SATURATION: 100 % | HEART RATE: 67 BPM

## 2022-05-24 DIAGNOSIS — R10.13 EPIGASTRIC PAIN: ICD-10-CM

## 2022-05-24 DIAGNOSIS — E66.9 OBESITY (BMI 30-39.9): ICD-10-CM

## 2022-05-24 DIAGNOSIS — R12 HEARTBURN: ICD-10-CM

## 2022-05-24 DIAGNOSIS — L98.7 EXCESSIVE AND REDUNDANT SKIN AND SUBCUTANEOUS TISSUE: ICD-10-CM

## 2022-05-24 DIAGNOSIS — Z98.84 S/P GASTRIC BYPASS: ICD-10-CM

## 2022-05-24 DIAGNOSIS — K91.2 POST-RESECTION MALABSORPTION: Primary | ICD-10-CM

## 2022-05-24 PROCEDURE — 99213 OFFICE O/P EST LOW 20 MIN: CPT | Performed by: NURSE PRACTITIONER

## 2022-05-24 RX ORDER — SUCRALFATE 1 G/10ML
1 SUSPENSION ORAL
Qty: 420 ML | Refills: 2 | Status: SHIPPED | OUTPATIENT
Start: 2022-05-24

## 2022-05-24 RX ORDER — OMEPRAZOLE 40 MG/1
40 CAPSULE, DELAYED RELEASE ORAL DAILY
Qty: 90 CAPSULE | Refills: 3 | Status: SHIPPED | OUTPATIENT
Start: 2022-05-24

## 2022-05-24 RX ORDER — MULTIVIT WITH MINERALS/HERBS
1 TABLET ORAL DAILY
COMMUNITY

## 2022-05-24 NOTE — PROGRESS NOTES
Bariatric Postoperative Progress Note    CC: 6 Month LGBP Follow Up    Akshat Moore is a 40 y.o. female now 6 months status post laparoscopic gastric bypass surgery performed on 11/22/21. Doing well overall. Currently eating tuna, yogurt, chicekn, turkey, fish, squash, zucchini , cauliflower, broccoli, cucumbers, blueberries, strawberries, watermelon, orange, string cheese, nuts, occ sugar free pudding. Taking in 70 oz water,  70-80g protein. 60 min of walking/boot camp class 4 days a week. Currently has right ankle injury which is limiting activity. Will get this evaluated later today. Bowel movements are regular. She is compliant with multivitamins, calcium, Vit D and B12 supplements. Drinking 2 protein shakes daily. Hair loss has improved. Epigastric pain and heartburn have improved with PPI and Carafate. She has not seen GI due to symptom improvement. Requesting refills of these medications. Itching under pannus has improved with Nystatin. Still experiencing even with use of Nystatin, support garments, as well as fastidious skin care. Taking BP at home daily. Was 128/76 at home this morning. Denies any NSAID, ETOH, or tobacco use.       Weight Loss Metrics 5/24/2022 5/24/2022 3/24/2022 3/24/2022 2/22/2022 2/22/2022 12/9/2021   Pre op / Initial Wt 284 - 284 - 284 - 284   Today's Wt - 211 lb - 230 lb - 240 lb -   BMI - 37.38 kg/m2 - 40.74 kg/m2 - 42.51 kg/m2 -   Ideal Body Wt 128 - 128 - 128 - 128   Excess Body Wt 156 - 156 - 156 - 156   Goal Wt 149 - 159 - 159 - 159.2   Wt loss to date 73 - 54 - 44 - 16.2   % Wt Loss 0.58 - 0.43 - 0.35 - 0.13   80% .8 - 124.8 - 124.8 - 124.8       Comorbidities:  Hypertension: improved  Diabetes: not applicable  Obstructive Sleep Apnea: resolved  Hyperlipidemia: not applicable  Stress Urinary Incontinence: not applicable  Gastroesophageal Reflux: improved  Weight related arthropathy:improved        Past Medical History:   Diagnosis Date    Asthma     Diabetes ([facility] [address]     NIDDM    Hypertension     Multiple gastric ulcers  2015 ,01/2021    Perforated 2015    PCOS (polycystic ovarian syndrome)        Past Surgical History:   Procedure Laterality Date    HX CARPAL TUNNEL RELEASE Bilateral 2017    HX CHOLECYSTECTOMY  2000    HX GI      HX LITHOTRIPSY  2013    [place] ABDOMEN SURGERY PROC UNLISTED  2015    Perforated ulcer repair       Current Outpatient Medications   Medication Sig Dispense Refill    b complex vitamins tablet Take 1 Tablet by mouth daily.  omeprazole (PRILOSEC) 40 mg capsule Take 1 Capsule by mouth daily. Can open capsule and sprinkle in yogurt (do not use applesauce) 90 Capsule 3    sucralfate (CARAFATE) 100 mg/mL suspension Take 10 mL by mouth four (4) times daily as needed for GI Pain. 420 mL 2    BIOTIN PO Take  by mouth.  nystatin (MYCOSTATIN) powder Apply  to affected area four (4) times daily. 1 Each 2    metoprolol succinate (Toprol XL) 50 mg XL tablet Take 50 mg by mouth daily.  calcium citrate 200 mg (950 mg) tablet Take  by mouth daily.  cyanocobalamin (VITAMIN B12) 500 mcg tablet Take 500 mcg by mouth daily.  ferrous sulfate (Iron) 325 mg (65 mg iron) tablet Take  by mouth Daily (before breakfast).  ascorbic acid, vitamin C, (Vitamin C) 500 mg tablet Take  by mouth.  dextroamphetamine-amphetamine (ADDERALL) 10 mg tablet Take 5 mg by mouth two (2) times a day.  losartan (COZAAR) 50 mg tablet Take 50 mg by mouth daily.  cholecalciferol, vitamin D3, 50 mcg (2,000 unit) tab Take 2,000 Units by mouth daily.  multivit with calcium,iron,min [facility] MULTIPLE VITAMINS PO) Take 1 Tablet by mouth daily.  escitalopram oxalate (Lexapro) 10 mg tablet Take 10 mg by mouth daily.  montelukast (SINGULAIR) 10 mg tablet Take 10 mg by mouth nightly.       albuterol (PROVENTIL HFA, VENTOLIN HFA, PROAIR HFA) 90 mcg/actuation inhaler Take 2 Puffs by inhalation every four (4) hours as needed for Wheezing. (Patient not taking: Reported on 2/22/2022)      acetaminophen (TylenoL) 325 mg tablet Take  by mouth every four (4) hours as needed for Pain. (Patient not taking: Reported on 2/22/2022)         Allergies   Allergen Reactions    Onion Anaphylaxis    Eucerin [Mineral Oil-Isopropyl Myristat] Hives    Pcn [Penicillins] Hives    Reglan [Metoclopramide] Hives       ROS:  Review of Systems   Constitutional: Positive for weight loss. Negative for malaise/fatigue. Eyes: Negative. Respiratory: Negative. Cardiovascular: Negative for chest pain and palpitations. Gastrointestinal: Positive for heartburn. Negative for abdominal pain, blood in stool, constipation, diarrhea, melena, nausea and vomiting. Genitourinary: Negative. Musculoskeletal: Positive for joint pain. Skin: Positive for itching (under pannus from excess skin). Neurological: Negative for dizziness, tingling, loss of consciousness, weakness and headaches. Physicial Exam:  Visit Vitals  BP (!) 164/102 (BP 1 Location: Left upper arm, BP Patient Position: Sitting)   Pulse 67   Temp 97 °F (36.1 °C)   Ht 5' 3\" (1.6 m)   Wt 95.7 kg (211 lb)   SpO2 100%   BMI 37.38 kg/m²     Physical Exam  Vitals and nursing note reviewed. Constitutional:       Appearance: She is obese. HENT:      Head: Normocephalic and atraumatic. Cardiovascular:      Rate and Rhythm: Normal rate. Heart sounds: Normal heart sounds. Pulmonary:      Effort: Pulmonary effort is normal.      Breath sounds: Normal breath sounds. Abdominal:      General: Bowel sounds are normal. There is no distension. Palpations: Abdomen is soft. There is no mass. Tenderness: There is no abdominal tenderness. Hernia: No hernia is present. Musculoskeletal:         General: Normal range of motion. Skin:     General: Skin is warm and dry. Neurological:      General: No focal deficit present.       Mental Status: She is alert and oriented to person, place, and time. Psychiatric:         Mood and Affect: Mood normal.         Behavior: Behavior normal.         Labs:   Recent Results (from the past 672 hour(s))   CBC W/O DIFF    Collection Time: 05/18/22  9:33 AM   Result Value Ref Range    WBC 11.5 4.6 - 13.2 K/uL    RBC 5.60 (H) 4.20 - 5.30 M/uL    HGB 14.4 12.0 - 16.0 g/dL    HCT 47.1 (H) 35.0 - 45.0 %    MCV 84.1 78.0 - 100.0 FL    MCH 25.7 24.0 - 34.0 PG    MCHC 30.6 (L) 31.0 - 37.0 g/dL    RDW 15.6 (H) 11.6 - 14.5 %    PLATELET 573 358 - 450 K/uL    MPV 11.2 9.2 - 11.8 FL    NRBC 0.0 0  WBC    ABSOLUTE NRBC 0.00 0.00 - 0.01 K/uL   FERRITIN    Collection Time: 05/18/22  9:33 AM   Result Value Ref Range    Ferritin 44 8 - 388 NG/ML   IRON    Collection Time: 05/18/22  9:33 AM   Result Value Ref Range    Iron 52 50 - 096 ug/dL   METABOLIC PANEL, COMPREHENSIVE    Collection Time: 05/18/22  9:33 AM   Result Value Ref Range    Sodium 141 136 - 145 mmol/L    Potassium 4.7 3.5 - 5.5 mmol/L    Chloride 109 100 - 111 mmol/L    CO2 26 21 - 32 mmol/L    Anion gap 6 3.0 - 18 mmol/L    Glucose 74 74 - 99 mg/dL    BUN 15 7.0 - 18 MG/DL    Creatinine 1.00 0.6 - 1.3 MG/DL    BUN/Creatinine ratio 15 12 - 20      GFR est AA >60 >60 ml/min/1.73m2    GFR est non-AA 60 (L) >60 ml/min/1.73m2    Calcium 8.9 8.5 - 10.1 MG/DL    Bilirubin, total 0.5 0.2 - 1.0 MG/DL    ALT (SGPT) 31 13 - 56 U/L    AST (SGOT) 21 10 - 38 U/L    Alk.  phosphatase 104 45 - 117 U/L    Protein, total 7.2 6.4 - 8.2 g/dL    Albumin 3.7 3.4 - 5.0 g/dL    Globulin 3.5 2.0 - 4.0 g/dL    A-G Ratio 1.1 0.8 - 1.7     VITAMIN B12 & FOLATE    Collection Time: 05/18/22  9:33 AM   Result Value Ref Range    Vitamin B12 270 211 - 911 pg/mL    Folate 5.7 3.10 - 17.50 ng/mL   VITAMIN D, 25 HYDROXY    Collection Time: 05/18/22  9:33 AM   Result Value Ref Range    Vitamin D 25-Hydroxy 24.8 (L) 30 - 100 ng/mL       Assessment/Plan:   She is currently 6 months s/p laparoscopic gastric bypass surgery with a total weight loss of 73 lbs to date, doing well. Labs were reviewed and pt was instructed to continue MVI/B1/B12/D supplementation. Currently taking 5,000 units vitamin D daily. Discussed to increase to 10,000 units daily for two weeks, then decrease back to 5,000 units daily. BP elevated in office, pt asymptomatic and reports taking BP medications regularly. She reports she is running 120s/70s-80s at home, but is in pain today. Discussed to continue medications and taking BP daily at home. Decrease protein shake to no more than one daily. Will refill omeprazole 40 mg daily and Carafate 1 gram qid prn for GI pain. Continue using Nystatin, skin care, and support garments for itching caused by excess skin. We have reviewed the components of a successful postoperative course including requirement for a high protein, low carbohydrate diet, 64 oz a day of zero calorie liquids, daily vitamin supplementation, daily exercise (150 mis/week), regular follow-up, and participation in support groups. Refer to registered dietitian for more detailed medical nutrition therapy as needed. The primary encounter diagnosis was Post-resection malabsorption. Diagnoses of S/P gastric bypass, Epigastric pain, Excessive and redundant skin and subcutaneous tissue, Heartburn, Obesity (BMI 30-39.9), and BMI 37.0-37.9, adult were also pertinent to this visit. Follow-up and Dispositions    · Return in about 6 months (around 11/24/2022). with labs, sooner as needed.   Alison Abreu NP

## 2022-06-04 LAB — VIT B1 BLD-SCNC: 91 NMOL/L (ref 66.5–200)

## 2022-06-29 DIAGNOSIS — Z98.84 S/P GASTRIC BYPASS: ICD-10-CM

## 2022-06-29 DIAGNOSIS — E66.01 MORBID OBESITY (HCC): ICD-10-CM

## 2022-06-29 DIAGNOSIS — R10.13 EPIGASTRIC PAIN: ICD-10-CM

## 2022-06-29 DIAGNOSIS — L98.7 EXCESSIVE AND REDUNDANT SKIN AND SUBCUTANEOUS TISSUE: ICD-10-CM

## 2022-06-29 DIAGNOSIS — K91.2 POST-RESECTION MALABSORPTION: ICD-10-CM

## 2022-06-29 RX ORDER — NYSTATIN 100000 [USP'U]/G
POWDER TOPICAL
Qty: 30 G | Refills: 2 | Status: SHIPPED | OUTPATIENT
Start: 2022-06-29

## 2022-08-19 ENCOUNTER — HOSPITAL ENCOUNTER (OUTPATIENT)
Dept: BARIATRICS/WEIGHT MGMT | Age: 47
Discharge: HOME | End: 2022-08-19

## 2022-08-24 ENCOUNTER — DOCUMENTATION ONLY (OUTPATIENT)
Dept: BARIATRICS/WEIGHT MGMT | Age: 47
End: 2022-08-24

## 2022-08-24 NOTE — PROGRESS NOTES
[address] [facility] [place] [facility], [address]      Patient's Name: Deondre Gallardo   Age: 40 y.o. YOB: 1975   Sex: female    Date:   8/19/2022    Height: 63 inches Weight:    195.6       Lbs. BMI: 34.7     Surgery Date: 11/22/2021    Starting Weight: 267   LastRecorded Weight: 267  Overall Pounds Lost: 71.4     Procedure: bypass    Lowest Weight patient has achieved since surgery: 195    How long has patient been at this weight for: a week      Diet History (reported by patient on diet history form)    Do you smoke: no    Alcohol Intake: none     Meals per day: 3    Portion sizes ~: 3 ounces     Simple sugar intake: none    Experiencing dumping syndrome: no    Carbohydrate intake: very few        Ounces of fluid consumed per day: 64    Fluids being consumed: water, unsweetened green tea crystal light    Patient not drinking protein drinks    Patient is snacking one time a day     Exercise History    Patient is exercising every day    Vitamins    Patient compliant with all vitamins    Summary: Weight loss is at 195.6 pounds. I have reinforced the key diet principles to the patient. Patient was instructed to follow a 3 meal a day routine. I have reinforced the importance of filling up on meat and vegetables and avoiding carbohydrates. I have talked with patient about reactive hypoglycemia and although carbohydrates are not good from a weight-management point of view, they are actually very dangerous and should be avoided. If patient is getting hungry between meals focus on meat and vegetables and a list of food choices was reviewed with patient. Reinforced to patient the importance of being properly hydrated and the need to get 64 ounces of fluid in per day. Reinforced to patient the need to do 30 minutes of exercise per day. We also spent some time talking about the vitamins and the importance of taking them. Reinforced the dosage of vitamins to patient. Patient was reminded that the vitamins are lifelong.           Nanci Vera RDN  8/24/2022

## 2022-11-14 ENCOUNTER — HOSPITAL ENCOUNTER (OUTPATIENT)
Dept: LAB | Age: 47
Discharge: HOME | End: 2022-11-14
Payer: OTHER GOVERNMENT

## 2022-11-14 DIAGNOSIS — R10.13 EPIGASTRIC PAIN: ICD-10-CM

## 2022-11-14 DIAGNOSIS — E66.9 OBESITY (BMI 30-39.9): ICD-10-CM

## 2022-11-14 DIAGNOSIS — K91.2 POST-RESECTION MALABSORPTION: ICD-10-CM

## 2022-11-14 DIAGNOSIS — R12 HEARTBURN: ICD-10-CM

## 2022-11-14 DIAGNOSIS — L98.7 EXCESSIVE AND REDUNDANT SKIN AND SUBCUTANEOUS TISSUE: ICD-10-CM

## 2022-11-14 DIAGNOSIS — Z98.84 S/P GASTRIC BYPASS: ICD-10-CM

## 2022-11-14 LAB
25(OH)D3 SERPL-MCNC: 27.6 NG/ML (ref 30–100)
ALBUMIN SERPL-MCNC: 3.5 G/DL (ref 3.4–5)
ALBUMIN/GLOB SERPL: 1 {RATIO} (ref 0.8–1.7)
ALP SERPL-CCNC: 94 U/L (ref 45–117)
ALT SERPL-CCNC: 22 U/L (ref 13–56)
ANION GAP SERPL CALC-SCNC: 6 MMOL/L (ref 3–18)
AST SERPL-CCNC: 15 U/L (ref 10–38)
BILIRUB SERPL-MCNC: 0.5 MG/DL (ref 0.2–1)
BUN SERPL-MCNC: 15 MG/DL (ref 7–18)
BUN/CREAT SERPL: 14 (ref 12–20)
CALCIUM SERPL-MCNC: 9.1 MG/DL (ref 8.5–10.1)
CHLORIDE SERPL-SCNC: 108 MMOL/L (ref 100–111)
CO2 SERPL-SCNC: 28 MMOL/L (ref 21–32)
CREAT SERPL-MCNC: 1.08 MG/DL (ref 0.6–1.3)
ERYTHROCYTE [DISTWIDTH] IN BLOOD BY AUTOMATED COUNT: 12.9 % (ref 11.6–14.5)
FERRITIN SERPL-MCNC: 26 NG/ML (ref 8–388)
FOLATE SERPL-MCNC: >20 NG/ML (ref 3.1–17.5)
GLOBULIN SER CALC-MCNC: 3.4 G/DL (ref 2–4)
GLUCOSE SERPL-MCNC: 86 MG/DL (ref 74–99)
HCT VFR BLD AUTO: 45.9 % (ref 35–45)
HGB BLD-MCNC: 14.6 G/DL (ref 12–16)
IRON SERPL-MCNC: 60 UG/DL (ref 50–175)
MCH RBC QN AUTO: 28 PG (ref 24–34)
MCHC RBC AUTO-ENTMCNC: 31.8 G/DL (ref 31–37)
MCV RBC AUTO: 88.1 FL (ref 78–100)
NRBC # BLD: 0 K/UL (ref 0–0.01)
NRBC BLD-RTO: 0 PER 100 WBC
PLATELET # BLD AUTO: 364 K/UL (ref 135–420)
PMV BLD AUTO: 10.6 FL (ref 9.2–11.8)
POTASSIUM SERPL-SCNC: 4.2 MMOL/L (ref 3.5–5.5)
PROT SERPL-MCNC: 6.9 G/DL (ref 6.4–8.2)
RBC # BLD AUTO: 5.21 M/UL (ref 4.2–5.3)
SODIUM SERPL-SCNC: 142 MMOL/L (ref 136–145)
VIT B12 SERPL-MCNC: 234 PG/ML (ref 211–911)
WBC # BLD AUTO: 10.5 K/UL (ref 4.6–13.2)

## 2022-11-14 PROCEDURE — 80053 COMPREHEN METABOLIC PANEL: CPT

## 2022-11-14 PROCEDURE — 36415 COLL VENOUS BLD VENIPUNCTURE: CPT

## 2022-11-14 PROCEDURE — 82306 VITAMIN D 25 HYDROXY: CPT

## 2022-11-14 PROCEDURE — 84425 ASSAY OF VITAMIN B-1: CPT

## 2022-11-14 PROCEDURE — 83540 ASSAY OF IRON: CPT

## 2022-11-14 PROCEDURE — 85027 COMPLETE CBC AUTOMATED: CPT

## 2022-11-14 PROCEDURE — 82728 ASSAY OF FERRITIN: CPT

## 2022-11-14 PROCEDURE — 82607 VITAMIN B-12: CPT

## 2022-11-17 LAB — VIT B1 BLD-SCNC: 84.6 NMOL/L (ref 66.5–200)

## 2022-11-22 ENCOUNTER — OFFICE VISIT (OUTPATIENT)
Dept: SURGERY | Age: 47
End: 2022-11-22
Payer: OTHER GOVERNMENT

## 2022-11-22 VITALS
DIASTOLIC BLOOD PRESSURE: 90 MMHG | HEIGHT: 63 IN | TEMPERATURE: 98 F | WEIGHT: 187 LBS | SYSTOLIC BLOOD PRESSURE: 140 MMHG | BODY MASS INDEX: 33.13 KG/M2

## 2022-11-22 DIAGNOSIS — Z98.84 S/P GASTRIC BYPASS: ICD-10-CM

## 2022-11-22 DIAGNOSIS — K91.2 POST-RESECTION MALABSORPTION: Primary | ICD-10-CM

## 2022-11-22 DIAGNOSIS — L30.4 INTERTRIGO: ICD-10-CM

## 2022-11-22 DIAGNOSIS — E66.9 OBESITY (BMI 30-39.9): ICD-10-CM

## 2022-11-22 DIAGNOSIS — L98.7 EXCESSIVE AND REDUNDANT SKIN AND SUBCUTANEOUS TISSUE: ICD-10-CM

## 2022-11-22 DIAGNOSIS — E55.9 VITAMIN D INSUFFICIENCY: ICD-10-CM

## 2022-11-22 PROCEDURE — 99213 OFFICE O/P EST LOW 20 MIN: CPT | Performed by: NURSE PRACTITIONER

## 2022-11-22 RX ORDER — ERGOCALCIFEROL 1.25 MG/1
50000 CAPSULE ORAL
Qty: 12 CAPSULE | Refills: 3 | Status: SHIPPED | OUTPATIENT
Start: 2022-11-22

## 2022-11-22 NOTE — PROGRESS NOTES
Bariatric Postoperative Progress Note    CC: 12 Month LGBP Follow Up    Harpreet Youssef is a 56 y.o. female now 1 year status post laparoscopic gastric bypass surgery performed on 11/22/21. Doing well overall. Currently eating fish, chicken, pork, beans, yogurt, cheese, variety of vegetables, raspberries, strawberries. Taking in 75 oz water,  unknown g protein. 45 min of activity 2 days a week. Bowel movements are alternating constipation and regularity. She is compliant with multivitamins, calcium, Vit D and B12 supplements. Reports she will wake up with dry mouth in the middle of the night and will want to chug water. Still experiencing hair loss. Experienced flare up of intertrigo in umbillicus and in between buttocks. Keeping areas clean and dry, using support garments, and Nystatin. Denies any NSAID, ETOH, or tobacco use. Happy at current weight but okay if loses more.      Weight Loss Metrics 11/22/2022 11/22/2022 5/24/2022 5/24/2022 3/24/2022 3/24/2022 2/22/2022   Pre op / Initial Wt 284 - 284 - 284 - 284   Today's Wt - 187 lb - 211 lb - 230 lb -   BMI - 33.13 kg/m2 - 37.38 kg/m2 - 40.74 kg/m2 -   Ideal Body Wt 128 - 128 - 128 - 128   Excess Body Wt 156 - 156 - 156 - 156   Goal Wt 159 - 149 - 159 - 159   Wt loss to date 97 - 73 - 54 - 44   % Wt Loss 0.78 - 0.58 - 0.43 - 0.35   80% .8 - 124.8 - 124.8 - 124.8       Comorbidities:  Hypertension: improved  Diabetes: not applicable  Obstructive Sleep Apnea: resolved  Hyperlipidemia: not applicable  Stress Urinary Incontinence: not applicable  Gastroesophageal Reflux: improved  Weight related arthropathy:improved      Past Medical History:   Diagnosis Date    Asthma     Diabetes ([address]     NIDDM    Hypertension     Multiple gastric ulcers  2015 ,01/2021    Perforated 2015    PCOS (polycystic ovarian syndrome)        Past Surgical History:   Procedure Laterality Date    HX CARPAL TUNNEL RELEASE Bilateral 2017    HX CHOLECYSTECTOMY  2000    HX GI HX LITHOTRIPSY  2013    [place] ABDOMEN SURGERY PROC UNLISTED  2015    Perforated ulcer repair       Current Outpatient Medications   Medication Sig Dispense Refill    ergocalciferol (ERGOCALCIFEROL) 1,250 mcg (50,000 unit) capsule Take 1 Capsule by mouth every seven (7) days. 12 Capsule 3    Nystop powder APPLY TO THE AFFECTED AREA FOUR TIMES DAILY 30 g 2    b complex vitamins tablet Take 1 Tablet by mouth daily. omeprazole (PRILOSEC) 40 mg capsule Take 1 Capsule by mouth daily. Can open capsule and sprinkle in yogurt (do not use applesauce) 90 Capsule 3    BIOTIN PO Take  by mouth.      metoprolol succinate (TOPROL-XL) 50 mg XL tablet Take 50 mg by mouth daily. calcium citrate 200 mg (950 mg) tablet Take  by mouth daily. cyanocobalamin (VITAMIN B12) 500 mcg tablet Take 500 mcg by mouth daily. ferrous sulfate 325 mg (65 mg iron) tablet Take  by mouth Daily (before breakfast). ascorbic acid, vitamin C, (VITAMIN C) 500 mg tablet Take  by mouth. dextroamphetamine-amphetamine (ADDERALL) 10 mg tablet Take 5 mg by mouth two (2) times a day. losartan (COZAAR) 50 mg tablet Take 50 mg by mouth daily. multivit with calcium,iron,min [facility] MULTIPLE VITAMINS PO) Take 1 Tablet by mouth daily. escitalopram oxalate (LEXAPRO) 10 mg tablet Take 10 mg by mouth daily. sucralfate (CARAFATE) 100 mg/mL suspension Take 10 mL by mouth four (4) times daily as needed for GI Pain. (Patient not taking: Reported on 11/22/2022) 420 mL 2       Allergies   Allergen Reactions    Onion Anaphylaxis    Eucerin [Mineral Oil-Isopropyl Myristat] Hives    Pcn [Penicillins] Hives    Reglan [Metoclopramide] Hives       ROS:  Review of Systems   Constitutional:  Positive for malaise/fatigue and weight loss. Eyes: Negative. Respiratory: Negative. Cardiovascular:  Negative for chest pain and palpitations. Gastrointestinal:  Positive for constipation.  Negative for abdominal pain, blood in stool, diarrhea, heartburn, melena, nausea and vomiting. Skin:  Positive for itching and rash. See HPI   Neurological:  Positive for dizziness. Negative for loss of consciousness, weakness and headaches. Physicial Exam:  Visit Vitals  BP (!) 140/90 (BP 1 Location: Left upper arm)   Temp 98 °F (36.7 °C) (Temporal)   Ht 5' 3\" (1.6 m)   Wt 84.8 kg (187 lb)   BMI 33.13 kg/m²     Physical Exam  Vitals and nursing note reviewed. Constitutional:       Appearance: She is obese. HENT:      Head: Normocephalic and atraumatic. Cardiovascular:      Rate and Rhythm: Normal rate. Heart sounds: Normal heart sounds. Pulmonary:      Effort: Pulmonary effort is normal.      Breath sounds: Normal breath sounds. Abdominal:      General: Bowel sounds are normal. There is no distension. Palpations: Abdomen is soft. There is no mass. Tenderness: There is no abdominal tenderness. Hernia: No hernia is present. Musculoskeletal:         General: Normal range of motion. Skin:     General: Skin is warm and dry. Neurological:      General: No focal deficit present. Mental Status: She is alert and oriented to person, place, and time.    Psychiatric:         Mood and Affect: Mood normal.         Behavior: Behavior normal.       Labs:   Recent Results (from the past 672 hour(s))   CBC W/O DIFF    Collection Time: 11/14/22 10:20 AM   Result Value Ref Range    WBC 10.5 4.6 - 13.2 K/uL    RBC 5.21 4.20 - 5.30 M/uL    HGB 14.6 12.0 - 16.0 g/dL    HCT 45.9 (H) 35.0 - 45.0 %    MCV 88.1 78.0 - 100.0 FL    MCH 28.0 24.0 - 34.0 PG    MCHC 31.8 31.0 - 37.0 g/dL    RDW 12.9 11.6 - 14.5 %    PLATELET 049 033 - 036 K/uL    MPV 10.6 9.2 - 11.8 FL    NRBC 0.0 0  WBC    ABSOLUTE NRBC 0.00 0.00 - 0.01 K/uL   FERRITIN    Collection Time: 11/14/22 10:20 AM   Result Value Ref Range    Ferritin 26 8 - 388 NG/ML   IRON    Collection Time: 11/14/22 10:20 AM   Result Value Ref Range    Iron 60 50 - 175 ug/dL METABOLIC PANEL, COMPREHENSIVE    Collection Time: 11/14/22 10:20 AM   Result Value Ref Range    Sodium 142 136 - 145 mmol/L    Potassium 4.2 3.5 - 5.5 mmol/L    Chloride 108 100 - 111 mmol/L    CO2 28 21 - 32 mmol/L    Anion gap 6 3.0 - 18 mmol/L    Glucose 86 74 - 99 mg/dL    BUN 15 7.0 - 18 MG/DL    Creatinine 1.08 0.6 - 1.3 MG/DL    BUN/Creatinine ratio 14 12 - 20      eGFR >60 >60 ml/min/1.73m2    Calcium 9.1 8.5 - 10.1 MG/DL    Bilirubin, total 0.5 0.2 - 1.0 MG/DL    ALT (SGPT) 22 13 - 56 U/L    AST (SGOT) 15 10 - 38 U/L    Alk. phosphatase 94 45 - 117 U/L    Protein, total 6.9 6.4 - 8.2 g/dL    Albumin 3.5 3.4 - 5.0 g/dL    Globulin 3.4 2.0 - 4.0 g/dL    A-G Ratio 1.0 0.8 - 1.7     VITAMIN B1, WHOLE BLOOD    Collection Time: 11/14/22 10:20 AM   Result Value Ref Range    Vitamin B1 84.6 66.5 - 200.0 nmol/L   VITAMIN B12 & FOLATE    Collection Time: 11/14/22 10:20 AM   Result Value Ref Range    Vitamin B12 234 211 - 911 pg/mL    Folate >20.0 (H) 3.10 - 17.50 ng/mL   VITAMIN D, 25 HYDROXY    Collection Time: 11/14/22 10:20 AM   Result Value Ref Range    Vitamin D 25-Hydroxy 27.6 (L) 30 - 100 ng/mL       Assessment/Plan:   She is currently 1 year s/p laparoscopic gastric bypass surgery with a total weight loss of 97 lbs to date, doing well. Labs were reviewed and pt was instructed to continue MVI/B1/B12/D supplementation. Still having vitamin D insufficiency with daily dosing, will prescribe high dose weekly. Since experiencing dry mouth only upon waking up, discussed that she could be sleeping with open mouth. Pt reports getting in at least 60 g protein daily and taking vitamins daily with minimal improvement in hair loss. Discussed to have TSH levels evaluated with PCP due to minimal improvement in hair loss as well as experiencing constipation and fatigue. Continue with skin care, support garments, and use of Nystatin for skin itching/irritation.      We have reviewed the components of a successful postoperative course including requirement for a high protein, low carbohydrate diet, 64 oz a day of zero calorie liquids, daily vitamin supplementation, daily exercise (150 mis/week), regular follow-up, and participation in support groups. Refer to registered dietitian for more detailed medical nutrition therapy as needed. The primary encounter diagnosis was Post-resection malabsorption. Diagnoses of S/P gastric bypass, Obesity (BMI 30-39.9), BMI 33.0-33.9,adult, Excessive and redundant skin and subcutaneous tissue, Intertrigo, and Vitamin D insufficiency were also pertinent to this visit. Follow-up and Dispositions    Return in about 1 year (around 11/22/2023). with labs, sooner as needed.   Shalonda Carty NP

## 2023-10-03 NOTE — NURSE NAVIGATOR
Per MBSAQIP requirements:  Letter and email sent requesting follow up appointment. [employer] [place] [facility] [place]      Dear Patient,    Your health is our main concern. It is important for your health to have follow-up lab work and to see your surgeon at 3 months, 6 months and annually after your weight loss surgery. Additionally, the Department of bariatric Surgery at our hospital is a member of the Metabolic and Bariatric Surgery Accreditation and Quality Improvement Program [facility]). As a participant in this program, we gather information on the outcomes of our patients after surgery. Please call the office for a follow up appointment at [phone] [facility]) or [phone] [facility]). If you have moved out of the area or have changed surgeons please call us and let us know the name of your doctor. Your health and feedback are important to us. We greatly appreciate your response.        Thank you,  [employer] [place] [facility] [address]

## 2023-11-08 ENCOUNTER — TELEPHONE (OUTPATIENT)
Age: 48
End: 2023-11-08

## 2023-11-08 DIAGNOSIS — E66.9 CLASS 1 OBESITY WITHOUT SERIOUS COMORBIDITY WITH BODY MASS INDEX (BMI) OF 33.0 TO 33.9 IN ADULT, UNSPECIFIED OBESITY TYPE: ICD-10-CM

## 2023-11-08 DIAGNOSIS — L98.7 EXCESSIVE AND REDUNDANT SKIN AND SUBCUTANEOUS TISSUE: ICD-10-CM

## 2023-11-08 DIAGNOSIS — Z98.84 S/P GASTRIC BYPASS: Primary | ICD-10-CM

## 2023-11-08 NOTE — TELEPHONE ENCOUNTER
Patient called requesting a refill for Nystatin powder. Please send to [facility] on file.

## 2023-11-09 RX ORDER — NYSTATIN 100000 [USP'U]/G
POWDER TOPICAL
Qty: 60 G | Refills: 2 | Status: SHIPPED | OUTPATIENT
Start: 2023-11-09

## 2023-11-10 ENCOUNTER — HOSPITAL ENCOUNTER (OUTPATIENT)
Facility: HOSPITAL | Age: 48
End: 2023-11-10
Payer: COMMERCIAL

## 2023-11-10 DIAGNOSIS — K91.2 POSTSURGICAL MALABSORPTION, NOT ELSEWHERE CLASSIFIED: ICD-10-CM

## 2023-11-10 DIAGNOSIS — K91.2 POSTSURGICAL MALABSORPTION, NOT ELSEWHERE CLASSIFIED: Primary | ICD-10-CM

## 2023-11-10 DIAGNOSIS — E55.9 VITAMIN D DEFICIENCY, UNSPECIFIED: ICD-10-CM

## 2023-11-10 LAB
25(OH)D3 SERPL-MCNC: 30 NG/ML (ref 30–100)
ALBUMIN SERPL-MCNC: 3.5 G/DL (ref 3.4–5)
ALBUMIN/GLOB SERPL: 1 (ref 0.8–1.7)
ALP SERPL-CCNC: 90 U/L (ref 45–117)
ALT SERPL-CCNC: 25 U/L (ref 13–56)
ANION GAP SERPL CALC-SCNC: 6 MMOL/L (ref 3–18)
AST SERPL-CCNC: 18 U/L (ref 10–38)
BASOPHILS # BLD: 0.1 K/UL (ref 0–0.1)
BASOPHILS NFR BLD: 1 % (ref 0–2)
BILIRUB SERPL-MCNC: 0.4 MG/DL (ref 0.2–1)
BUN SERPL-MCNC: 16 MG/DL (ref 7–18)
BUN/CREAT SERPL: 13 (ref 12–20)
CALCIUM SERPL-MCNC: 8.8 MG/DL (ref 8.5–10.1)
CHLORIDE SERPL-SCNC: 106 MMOL/L (ref 100–111)
CO2 SERPL-SCNC: 29 MMOL/L (ref 21–32)
CREAT SERPL-MCNC: 1.2 MG/DL (ref 0.6–1.3)
DIFFERENTIAL METHOD BLD: NORMAL
EOSINOPHIL # BLD: 0.2 K/UL (ref 0–0.4)
EOSINOPHIL NFR BLD: 2 % (ref 0–5)
ERYTHROCYTE [DISTWIDTH] IN BLOOD BY AUTOMATED COUNT: 13.7 % (ref 11.6–14.5)
FERRITIN SERPL-MCNC: 13 NG/ML (ref 8–388)
FOLATE SERPL-MCNC: >20 NG/ML (ref 3.1–17.5)
GLOBULIN SER CALC-MCNC: 3.5 G/DL (ref 2–4)
GLUCOSE SERPL-MCNC: 88 MG/DL (ref 74–99)
HCT VFR BLD AUTO: 42.8 % (ref 35–45)
HGB BLD-MCNC: 13.5 G/DL (ref 12–16)
IMM GRANULOCYTES # BLD AUTO: 0 K/UL (ref 0–0.04)
IMM GRANULOCYTES NFR BLD AUTO: 0 % (ref 0–0.5)
IRON SERPL-MCNC: 24 UG/DL (ref 50–175)
LYMPHOCYTES # BLD: 2.6 K/UL (ref 0.9–3.6)
LYMPHOCYTES NFR BLD: 23 % (ref 21–52)
MCH RBC QN AUTO: 26.4 PG (ref 24–34)
MCHC RBC AUTO-ENTMCNC: 31.5 G/DL (ref 31–37)
MCV RBC AUTO: 83.8 FL (ref 78–100)
MONOCYTES # BLD: 1 K/UL (ref 0.05–1.2)
MONOCYTES NFR BLD: 9 % (ref 3–10)
NEUTS SEG # BLD: 7.3 K/UL (ref 1.8–8)
NEUTS SEG NFR BLD: 65 % (ref 40–73)
NRBC # BLD: 0 K/UL (ref 0–0.01)
NRBC BLD-RTO: 0 PER 100 WBC
PLATELET # BLD AUTO: 359 K/UL (ref 135–420)
PMV BLD AUTO: 10.4 FL (ref 9.2–11.8)
POTASSIUM SERPL-SCNC: 4.5 MMOL/L (ref 3.5–5.5)
PROT SERPL-MCNC: 7 G/DL (ref 6.4–8.2)
RBC # BLD AUTO: 5.11 M/UL (ref 4.2–5.3)
SODIUM SERPL-SCNC: 141 MMOL/L (ref 136–145)
VIT B12 SERPL-MCNC: 226 PG/ML (ref 211–911)
WBC # BLD AUTO: 11.2 K/UL (ref 4.6–13.2)

## 2023-11-10 PROCEDURE — 82746 ASSAY OF FOLIC ACID SERUM: CPT

## 2023-11-10 PROCEDURE — 85025 COMPLETE CBC W/AUTO DIFF WBC: CPT

## 2023-11-10 PROCEDURE — 82728 ASSAY OF FERRITIN: CPT

## 2023-11-10 PROCEDURE — 36415 COLL VENOUS BLD VENIPUNCTURE: CPT

## 2023-11-10 PROCEDURE — 80053 COMPREHEN METABOLIC PANEL: CPT

## 2023-11-10 PROCEDURE — 83540 ASSAY OF IRON: CPT

## 2023-11-10 PROCEDURE — 82607 VITAMIN B-12: CPT

## 2023-11-10 PROCEDURE — 84425 ASSAY OF VITAMIN B-1: CPT

## 2023-11-10 PROCEDURE — 82306 VITAMIN D 25 HYDROXY: CPT

## 2023-11-13 LAB — VIT B1 BLD-SCNC: 115.8 NMOL/L (ref 66.5–200)

## 2023-11-28 ENCOUNTER — OFFICE VISIT (OUTPATIENT)
Age: 48
End: 2023-11-28
Payer: COMMERCIAL

## 2023-11-28 VITALS
SYSTOLIC BLOOD PRESSURE: 160 MMHG | HEIGHT: 63 IN | BODY MASS INDEX: 32.78 KG/M2 | OXYGEN SATURATION: 97 % | DIASTOLIC BLOOD PRESSURE: 92 MMHG | HEART RATE: 67 BPM | TEMPERATURE: 98 F | WEIGHT: 185 LBS

## 2023-11-28 DIAGNOSIS — K91.2 POSTOPERATIVE INTESTINAL MALABSORPTION: Primary | ICD-10-CM

## 2023-11-28 DIAGNOSIS — E66.9 CLASS 1 OBESITY WITH SERIOUS COMORBIDITY AND BODY MASS INDEX (BMI) OF 32.0 TO 32.9 IN ADULT, UNSPECIFIED OBESITY TYPE: ICD-10-CM

## 2023-11-28 DIAGNOSIS — T73.0XXA HUNGRY, INITIAL ENCOUNTER: ICD-10-CM

## 2023-11-28 DIAGNOSIS — Z98.84 S/P GASTRIC BYPASS: ICD-10-CM

## 2023-11-28 PROCEDURE — 99214 OFFICE O/P EST MOD 30 MIN: CPT | Performed by: NURSE PRACTITIONER

## 2023-11-28 RX ORDER — TOPIRAMATE 25 MG/1
25 TABLET ORAL DAILY
Qty: 60 TABLET | Refills: 1 | Status: SHIPPED | OUTPATIENT
Start: 2023-11-28

## 2023-11-28 ASSESSMENT — ENCOUNTER SYMPTOMS
CONSTIPATION: 1
BLOOD IN STOOL: 0
NAUSEA: 0
VOMITING: 0
ABDOMINAL PAIN: 0

## 2023-11-28 NOTE — PROGRESS NOTES
B-12 226 211 - 911 pg/mL    Folate >20.0 (H) 3.10 - 17.50 ng/mL   Vitamin D 25 Hydroxy    Collection Time: 11/10/23 11:29 AM   Result Value Ref Range    Vit D, 25-Hydroxy 30.0 30 - 100 ng/mL       Assessment:  2 years s/p laparoscopic gastric bypass surgery  with a total weight loss of 99lbs to date, struggling with hunger    Plan:   Labs were reviewed, significant for low iron but CBC wnl and creatinine elevated from baseline but pt reports she did not drink much prior to lab draw and may have been dehydrated. Pt was instructed to continue recommended bariatric vitamin supplementation, ensure MVI contains at least 18 mg iron. Constipation protocol reviewed. BP elevated in office, pt asymptomatic. Reports BP at home runs in the 120s/80s. Discussed to continue monitoring at home and follow up with PCP if no improvement. Gas & bloating handout provided with AVS. Can also try Devrom. Will prescribe topiramate 25 mg daily to assist with appetite suppression due to increased reports of hunger. Can increase to 50 mg daily starting week two. Will have BRIDGET Sanchez reach out to provide further guidance. We have reviewed the components of a successful postoperative course including requirement for a high protein, low carbohydrate diet, 64 oz a day of zero calorie liquids, daily vitamin supplementation, daily exercise (150 mis/week), regular follow-up, and participation in support groups. The primary encounter diagnosis was Postoperative intestinal malabsorption. Diagnoses of S/P gastric bypass, Class 1 obesity with serious comorbidity and body mass index (BMI) of 32.0 to 32.9 in adult, unspecified obesity type, and Hungry, initial encounter were also pertinent to this visit. Return in about 4 weeks (around 12/26/2023) for Weight Management.  sooner as needed.   COLBY Matta NP    I spent >35 minutes reviewing previous notes, test results, and face to face with the patient counseling and

## 2023-11-28 NOTE — PATIENT INSTRUCTIONS
Devrom    Constipation    It typically can be prevented by drinking at least 64 ounces of water daily    If you're prone to constipation:  - Take a Stool Softener every day:  (such as Dulcolax Stool Softener)  - Eat Plenty of Fiber   Keep your fiber intake to at least 20 grams a day   Use SUGAR-FREE products: Fiber One products, Benefiber or Metamucil    If you get constipated:  1. Start with a Stool Softener such as Ducloax (bisacodyl)    Dulcolax Stool Softener 100 mg    2. Or try an osmotic laxative such as Miralax   1 capful daily, can go up to 3 capfuls daily    3. If you still have constipation after 2 or 3 days, add a Stimulant Laxative to the Stool Softener (this will produce a bowel movement in 6 - 12 hr):   Examples:    Exlax (1 small square) for up to 4 days    Dulcolax stimulant laxative    Magnesium citrate pill 500 mg start with once a day and go up to 3 times a day if needed    3. Or you can go straight to a combination Stool Softner / Stimulant at night. If you need, you can add a morning dose. Examples:    Pericolace 50 mg / 8.25 mg    Sennakot-S  50 mg / 8.25 mg    4.   If You're Really locked up, get Liquid Magnesium Citrate (take according to the  directions)

## 2023-12-01 ASSESSMENT — ENCOUNTER SYMPTOMS
DIARRHEA: 0
WHEEZING: 0
COUGH: 0
EYES NEGATIVE: 1
ABDOMINAL DISTENTION: 0
SHORTNESS OF BREATH: 0

## 2023-12-06 ENCOUNTER — CLINICAL DOCUMENTATION (OUTPATIENT)
Facility: HOSPITAL | Age: 48
End: 2023-12-06

## 2023-12-06 ENCOUNTER — HOSPITAL ENCOUNTER (OUTPATIENT)
Facility: HOSPITAL | Age: 48
Discharge: HOME | End: 2023-12-09

## 2023-12-06 NOTE — PROGRESS NOTES
RD reached out to patient to give ideas on meal suggestions and snack ideas. Pt. Is 2 years status post LGBP  Patient is at weight 185#  Pre-op weight is 284#. Goal weight is < 160#. Patient shares that she \"gets bored\" with her meal choices. RD provided some input on protein emphasized meal ideas. Some SMART goals to work on before next visit:    Patient shares that she uses colace daily. RD also emphasized more fiber, hopefully through vegetables, in the diet for better regularity. Patient will try to add adilia and limes to water and wean off of sucralose/splenda. We are set to chat in January.   Akiko Jensen RD

## 2023-12-26 RX ORDER — TOPIRAMATE 25 MG/1
TABLET ORAL
Qty: 145 TABLET | OUTPATIENT
Start: 2023-12-26

## 2024-01-02 ENCOUNTER — OFFICE VISIT (OUTPATIENT)
Age: 49
End: 2024-01-02
Payer: OTHER GOVERNMENT

## 2024-01-02 VITALS
DIASTOLIC BLOOD PRESSURE: 106 MMHG | TEMPERATURE: 98.2 F | HEART RATE: 96 BPM | HEIGHT: 63 IN | SYSTOLIC BLOOD PRESSURE: 160 MMHG | WEIGHT: 184 LBS | RESPIRATION RATE: 16 BRPM | OXYGEN SATURATION: 99 % | BODY MASS INDEX: 32.6 KG/M2

## 2024-01-02 DIAGNOSIS — T73.0XXD HUNGRY, SUBSEQUENT ENCOUNTER: ICD-10-CM

## 2024-01-02 DIAGNOSIS — Z98.84 S/P GASTRIC BYPASS: ICD-10-CM

## 2024-01-02 DIAGNOSIS — E66.9 CLASS 1 OBESITY WITH SERIOUS COMORBIDITY AND BODY MASS INDEX (BMI) OF 32.0 TO 32.9 IN ADULT, UNSPECIFIED OBESITY TYPE: Primary | ICD-10-CM

## 2024-01-02 DIAGNOSIS — G89.29 CHRONIC NONINTRACTABLE HEADACHE, UNSPECIFIED HEADACHE TYPE: ICD-10-CM

## 2024-01-02 DIAGNOSIS — R51.9 CHRONIC NONINTRACTABLE HEADACHE, UNSPECIFIED HEADACHE TYPE: ICD-10-CM

## 2024-01-02 DIAGNOSIS — Z71.3 ENCOUNTER FOR WEIGHT LOSS COUNSELING: ICD-10-CM

## 2024-01-02 DIAGNOSIS — Z79.899 FOLLOW-UP ENCOUNTER INVOLVING MEDICATION: ICD-10-CM

## 2024-01-02 PROCEDURE — 99213 OFFICE O/P EST LOW 20 MIN: CPT | Performed by: NURSE PRACTITIONER

## 2024-01-02 RX ORDER — TIRZEPATIDE 5 MG/.5ML
5 INJECTION, SOLUTION SUBCUTANEOUS
Qty: 2 ML | Refills: 0 | Status: SHIPPED | OUTPATIENT
Start: 2024-01-02

## 2024-01-02 RX ORDER — TIRZEPATIDE 2.5 MG/.5ML
2.5 INJECTION, SOLUTION SUBCUTANEOUS
Qty: 2 ML | Refills: 0 | Status: SHIPPED | OUTPATIENT
Start: 2024-01-02

## 2024-01-02 RX ORDER — TOPIRAMATE 50 MG/1
50 TABLET, FILM COATED ORAL DAILY
Qty: 90 TABLET | Refills: 1 | Status: SHIPPED | OUTPATIENT
Start: 2024-01-02

## 2024-01-02 NOTE — PROGRESS NOTES
Bariatric Postoperative Note    CC: Weight Management/Medication follow-up    Alice Qureshi is a 48 y.o. female now 2 years status post laparoscopic gastric bypass surgery performed on 11/22/2021.  -1 pound  Doing well overall.  Focusing on high-protein/low-carb bariatric diet taking in 90+ oz sugar free fluids,  unknown g protein.   min of activity 3 days a week. Bowel movements are constipated. She is compliant with recommended bariatric vitamin supplementation.  Has been working with Laura, RD  Has been taking 2 tabs of 25 mg topiramate daily, reports that medication has greatly helped with headaches but does not notice any suppression of appetite.      1/2/2024     9:00 AM 11/28/2023     9:09 AM 11/22/2022     9:51 AM 5/24/2022    10:27 AM 3/24/2022    10:58 AM 2/22/2022    10:55 AM 12/9/2021     9:11 AM   Weight Loss Metrics   Pre-op weight (manual entry) 284 lbs 284 lbs        Height 5' 3\" 5' 3\" 5' 3\" 5' 3\" 5' 3\" 5' 3\" 5' 3\"   Weight - Scale 184 lbs 185 lbs 187 lbs 211 lbs 230 lbs 240 lbs 267 lbs 13 oz   BMI (Calculated) 32.7 kg/m2 32.8 kg/m2 33.2 kg/m2 37.5 kg/m2 40.8 kg/m2 42.6 kg/m2 47.5 kg/m2   Ideal body weight (manual entry) 128 lbs 128 lbs        EBW in lbs. 156 156        Weight loss to date in lbs. 100 99        Percent weight loss (%) 35.21 % 34.86 %        Percent EBW loss (%) 64.1 % 63.5 %              Past Medical History:   Diagnosis Date    Asthma     Diabetes (HCC)     NIDDM    Hypertension     Multiple gastric ulcers  2015 ,01/2021    Perforated 2015    PCOS (polycystic ovarian syndrome)     Stroke (HCC) 01/2023       Past Surgical History:   Procedure Laterality Date    CARPAL TUNNEL RELEASE Bilateral 2017    CHOLECYSTECTOMY  2000    GI      LITHOTRIPSY  2013    [place] UNLISTED PROCEDURE ABDOMEN PERITONEUM & OMENTUM  2015    Perforated ulcer repair       Current Outpatient Medications   Medication Sig Dispense Refill    Tirzepatide-Weight Management (ZEPBOUND) 2.5 MG/0.5ML SOAJ Inject

## 2024-01-04 ASSESSMENT — ENCOUNTER SYMPTOMS
NAUSEA: 0
DIARRHEA: 0
COUGH: 0
SHORTNESS OF BREATH: 0
ABDOMINAL PAIN: 0
CONSTIPATION: 1
VOMITING: 0

## 2024-01-18 ENCOUNTER — TELEPHONE (OUTPATIENT)
Age: 49
End: 2024-01-18

## 2024-01-18 ENCOUNTER — CLINICAL DOCUMENTATION (OUTPATIENT)
Age: 49
End: 2024-01-18

## 2024-01-18 NOTE — TELEPHONE ENCOUNTER
Attempted to call patient and inform her that a prior auth was submitted for Zepbound and it was denied, left voicemail to return call.

## 2024-01-18 NOTE — PROGRESS NOTES
Spoke to  representative regarding Ms. Qureshi's prior auth. She states prior auth was denied because the patient hasn't tried Phentermine, Qsymia, and Contrave, even though contraindications were provided.     KAREN Sainz will initiate an appeal once the consent form is signed by Ms. Qureshi.

## 2024-02-07 ENCOUNTER — CLINICAL DOCUMENTATION (OUTPATIENT)
Facility: HOSPITAL | Age: 49
End: 2024-02-07

## 2024-02-07 ENCOUNTER — HOSPITAL ENCOUNTER (OUTPATIENT)
Facility: HOSPITAL | Age: 49
Discharge: HOME | End: 2024-02-10

## 2024-02-07 NOTE — PROGRESS NOTES
BRIDGET called patient and reviewed previously set SMART goals.   Patient has figured out ways to not be constipated.  Patient has weaned off of splenda/sucralose..    Future smart goals are:  Set timer on TV.  Eat within 1 hour of waking up.  Laura Reyes RD

## 2024-02-20 ENCOUNTER — OFFICE VISIT (OUTPATIENT)
Age: 49
End: 2024-02-20
Payer: OTHER GOVERNMENT

## 2024-02-20 VITALS
SYSTOLIC BLOOD PRESSURE: 117 MMHG | WEIGHT: 187 LBS | TEMPERATURE: 97 F | BODY MASS INDEX: 33.13 KG/M2 | HEART RATE: 56 BPM | OXYGEN SATURATION: 99 % | DIASTOLIC BLOOD PRESSURE: 78 MMHG | HEIGHT: 63 IN

## 2024-02-20 DIAGNOSIS — Z71.3 ENCOUNTER FOR WEIGHT LOSS COUNSELING: ICD-10-CM

## 2024-02-20 DIAGNOSIS — E66.9 CLASS 1 OBESITY WITH SERIOUS COMORBIDITY AND BODY MASS INDEX (BMI) OF 32.0 TO 32.9 IN ADULT, UNSPECIFIED OBESITY TYPE: Primary | ICD-10-CM

## 2024-02-20 DIAGNOSIS — T73.0XXD HUNGRY, SUBSEQUENT ENCOUNTER: ICD-10-CM

## 2024-02-20 DIAGNOSIS — Z98.84 S/P GASTRIC BYPASS: ICD-10-CM

## 2024-02-20 PROCEDURE — 99213 OFFICE O/P EST LOW 20 MIN: CPT | Performed by: NURSE PRACTITIONER

## 2024-02-20 NOTE — PROGRESS NOTES
Bariatric Postoperative Progress Note    Chief Complaint   Patient presents with    Weight Management     Medication follow up, LGBP 11/22/21       Alice Qureshi is a 48 y.o. female now 2 years status post laparoscopic gastric bypass surgery performed on 11/22/2021.      Doing well overall.  Diet consists of fish, chicken, variety of vegetables, berries.   Compliant with recommended bariatric vitamin supplementation.  Was recommended by BRIDGET Sanchez to start berberine to assist with constipation which has improved greatly and BMs are now regular.    min of activity 3 days a week. Flu about two weeks ago which impacted activity.   75 oz of fluids per day  Has not been able to  Zepbound due to denial of insurance, would like to move forward with appeal.      2/20/2024     8:48 AM 1/2/2024     9:00 AM 11/28/2023     9:09 AM 11/22/2022     9:51 AM 5/24/2022    10:27 AM 3/24/2022    10:58 AM 2/22/2022    10:55 AM   Weight Loss Metrics   Pre-op weight (manual entry) 284 lbs 284 lbs 284 lbs       Height 5' 3\" 5' 3\" 5' 3\" 5' 3\" 5' 3\" 5' 3\" 5' 3\"   Weight - Scale 187 lbs 184 lbs 185 lbs 187 lbs 211 lbs 230 lbs 240 lbs   BMI (Calculated) 33.2 kg/m2 32.7 kg/m2 32.8 kg/m2 33.2 kg/m2 37.5 kg/m2 40.8 kg/m2 42.6 kg/m2   Ideal body weight (manual entry) 128 lbs 128 lbs 128 lbs       EBW in lbs. 156 156 156       Weight loss to date in lbs. 97 100 99       Percent weight loss (%) 34.15 % 35.21 % 34.86 %       Percent EBW loss (%) 62.2 % 64.1 % 63.5 %              Past Medical History:   Diagnosis Date    Asthma     Diabetes (HCC)     NIDDM    Hypertension     Multiple gastric ulcers  2015 ,01/2021    Perforated 2015    PCOS (polycystic ovarian syndrome)     Stroke (HCC) 01/2023       Past Surgical History:   Procedure Laterality Date    CARPAL TUNNEL RELEASE Bilateral 2017    CHOLECYSTECTOMY  2000    GI      LITHOTRIPSY  2013    [place] UNLISTED PROCEDURE ABDOMEN PERITONEUM & OMENTUM  2015    Perforated ulcer repair

## 2024-03-01 ENCOUNTER — TELEPHONE (OUTPATIENT)
Age: 49
End: 2024-03-01

## 2024-04-10 ENCOUNTER — HOSPITAL ENCOUNTER (OUTPATIENT)
Facility: HOSPITAL | Age: 49
Discharge: HOME | End: 2024-04-13

## 2024-04-12 ENCOUNTER — CLINICAL DOCUMENTATION (OUTPATIENT)
Facility: HOSPITAL | Age: 49
End: 2024-04-12

## 2024-04-12 NOTE — PROGRESS NOTES
BRIDGET reached out to patient to review previously set SMART goals.  She has made some positive changes.     Purchased a cover to block blue light from TV which she leaves on all night.  She is also starting to eat within an hour of waking up.  Patient is seeing her cardiologist for some struggles.      RD needed to educate patient on reactive hypoglycemia due to an episode of low blood sugar she had recently at a doctors office following a meal of too many carbohydrates.     Future SMART goals are:   Protein needs to be at 21 grams per meal and carbs should stay under 1  ounce per meal.   We are set to chat in June.    Laura Reyes RD

## 2024-04-16 ENCOUNTER — OFFICE VISIT (OUTPATIENT)
Age: 49
End: 2024-04-16
Payer: OTHER GOVERNMENT

## 2024-04-16 VITALS
BODY MASS INDEX: 34.02 KG/M2 | TEMPERATURE: 98.1 F | HEART RATE: 68 BPM | HEIGHT: 63 IN | SYSTOLIC BLOOD PRESSURE: 148 MMHG | RESPIRATION RATE: 16 BRPM | OXYGEN SATURATION: 98 % | WEIGHT: 192 LBS | DIASTOLIC BLOOD PRESSURE: 83 MMHG

## 2024-04-16 DIAGNOSIS — E66.9 CLASS 1 OBESITY WITHOUT SERIOUS COMORBIDITY WITH BODY MASS INDEX (BMI) OF 33.0 TO 33.9 IN ADULT, UNSPECIFIED OBESITY TYPE: Primary | ICD-10-CM

## 2024-04-16 DIAGNOSIS — L98.7 EXCESSIVE AND REDUNDANT SKIN AND SUBCUTANEOUS TISSUE: ICD-10-CM

## 2024-04-16 DIAGNOSIS — Z71.3 ENCOUNTER FOR WEIGHT LOSS COUNSELING: ICD-10-CM

## 2024-04-16 DIAGNOSIS — Z98.84 S/P GASTRIC BYPASS: ICD-10-CM

## 2024-04-16 PROCEDURE — 99213 OFFICE O/P EST LOW 20 MIN: CPT | Performed by: NURSE PRACTITIONER

## 2024-04-16 RX ORDER — TRIAMCINOLONE ACETONIDE 0.25 MG/G
CREAM TOPICAL
Qty: 15 G | Refills: 1 | Status: SHIPPED | OUTPATIENT
Start: 2024-04-16

## 2024-04-16 RX ORDER — NYSTATIN 100000 [USP'U]/G
POWDER TOPICAL
Qty: 60 G | Refills: 2 | Status: SHIPPED | OUTPATIENT
Start: 2024-04-16

## 2024-04-16 RX ORDER — NYSTATIN 100000 U/G
CREAM TOPICAL
Qty: 30 G | Refills: 1 | Status: SHIPPED | OUTPATIENT
Start: 2024-04-16

## 2024-04-16 RX ORDER — FUROSEMIDE 20 MG/1
20 TABLET ORAL DAILY
COMMUNITY
Start: 2024-04-09

## 2024-04-16 NOTE — PROGRESS NOTES
Bariatric Postoperative Nurse Note      Alice Titus is a 48 y.o. female status post laparoscopic gastric bypass surgery performed on 11/22/2021.      All Post-Ops (including two weeks)  -# of grams of protein daily? unknown  -sources of protein? Fish, eggs, cheese, and yogurt  -# of oz of sugar free fluids from all sources daily?   oz  -Nausea? No  -Vomiting? No  -Difficulty swallow/food sticking? No  -Heartburn/regurgitation? No  -Character of bowel movements (diarrhea/constipation/bloody stools?) regular  -Which multivitamin product are you taking? Procare   -What dose and how frequently are you taking calcium citrate? yes, once daily  - from any iron-containing multivitamin by 2 hours? No  -Ulcer risk exposures:   NSAID No  Tobacco No  Alcohol No  Steroids No  -Minutes of physical activity and what type? walking and riding bikes for 30-90 minutes everyday

## 2024-04-19 ASSESSMENT — ENCOUNTER SYMPTOMS
CONSTIPATION: 0
ABDOMINAL PAIN: 0
NAUSEA: 0
COUGH: 0
SHORTNESS OF BREATH: 0
DIARRHEA: 0
VOMITING: 0

## 2024-04-19 NOTE — PROGRESS NOTES
Bariatric Postoperative Progress Note    Chief Complaint   Patient presents with    Follow-up     LGBP 11/22/2021       Alice Qureshi is a 48 y.o. female now 2 years status post laparoscopic gastric bypass surgery performed on 11/22/2021 presenting for weight management.  +5 lbs  Reporting water retention, was prescribed Lasix by cardiology.    Still awaiting response regarding Zepbound formal review appeal with insurance.  Experiencing itching and skin irritation under pannus from excess skin chafing even with fastidious skin care, use of support garments, and OTC products.   See nurse note for additional subjective information.         4/16/2024     9:04 AM 2/20/2024     8:48 AM 1/2/2024     9:00 AM 11/28/2023     9:09 AM 11/22/2022     9:51 AM 5/24/2022    10:27 AM 3/24/2022    10:58 AM   Weight Loss Metrics   Pre-op weight (manual entry) 284 lbs 284 lbs 284 lbs 284 lbs      Height 5' 3\" 5' 3\" 5' 3\" 5' 3\" 5' 3\" 5' 3\" 5' 3\"   Weight - Scale 192 lbs 187 lbs 184 lbs 185 lbs 187 lbs 211 lbs 230 lbs   BMI (Calculated) 34.1 kg/m2 33.2 kg/m2 32.7 kg/m2 32.8 kg/m2 33.2 kg/m2 37.5 kg/m2 40.8 kg/m2   Ideal body weight (manual entry) 128 lbs 128 lbs 128 lbs 128 lbs      EBW in lbs. 156 156 156 156      Weight loss to date in lbs. 92 97 100 99      Percent weight loss (%) 32.39 % 34.15 % 35.21 % 34.86 %      Percent EBW loss (%) 59 % 62.2 % 64.1 % 63.5 %             Past Medical History:   Diagnosis Date    Asthma     Diabetes (HCC)     NIDDM    Hypertension     Multiple gastric ulcers  2015 ,01/2021    Perforated 2015    PCOS (polycystic ovarian syndrome)     Stroke (HCC) 01/2023       Past Surgical History:   Procedure Laterality Date    CARPAL TUNNEL RELEASE Bilateral 2017    CHOLECYSTECTOMY  2000    GI      LITHOTRIPSY  2013    [place] UNLISTED PROCEDURE ABDOMEN PERITONEUM & OMENTUM  2015    Perforated ulcer repair       Current Outpatient Medications   Medication Sig Dispense Refill    furosemide (LASIX) 20 MG tablet

## 2024-05-01 ENCOUNTER — TELEPHONE (OUTPATIENT)
Age: 49
End: 2024-05-01

## 2024-05-01 DIAGNOSIS — E66.9 CLASS 1 OBESITY WITHOUT SERIOUS COMORBIDITY WITH BODY MASS INDEX (BMI) OF 33.0 TO 33.9 IN ADULT, UNSPECIFIED OBESITY TYPE: Primary | ICD-10-CM

## 2024-05-01 NOTE — PROGRESS NOTES
As of 4/16/2024  Height: 5'3\"  Weight: 192 lbs  BMI 34.01    Formal review of Zepbound was denied as insurance requires to try Contrave with close monitoring. Will initiate at low dose due to potential interactions with her current medications. Pt verbalized understanding and would like to proceed with use of Contrave. She will monitor for symptoms of dizziness, SOB, lightheadedness, palpitations, or slow heart rate.     -Xenical is contraindicated due to her history of bariatric surgery.   -Phentermine contraindicated due to current use of Adderall as well as history of hypertension and TIA.   -Qsymia (phentermine/topiramate) contraindicated due to current use of Adderall as well as HTN and history of TIA. She has taken generic topiramate from 11/2023 through 3/2024 with no weight loss (treatment failure).     Diet & Exercise Plan:      High protein/low carb bariatric diet, focusing on smaller portion sizes and limiting snacking.   Tracking food intake via paper and pen or reggie like [employer].      Aim for at least 150 min exercise (walking/cardio) weekly.

## 2024-05-28 ENCOUNTER — OFFICE VISIT (OUTPATIENT)
Age: 49
End: 2024-05-28
Payer: COMMERCIAL

## 2024-05-28 VITALS
HEIGHT: 63 IN | RESPIRATION RATE: 16 BRPM | HEART RATE: 69 BPM | OXYGEN SATURATION: 100 % | BODY MASS INDEX: 34.2 KG/M2 | WEIGHT: 193 LBS | DIASTOLIC BLOOD PRESSURE: 92 MMHG | TEMPERATURE: 97.9 F | SYSTOLIC BLOOD PRESSURE: 142 MMHG

## 2024-05-28 DIAGNOSIS — Z79.899 FOLLOW-UP ENCOUNTER INVOLVING MEDICATION: ICD-10-CM

## 2024-05-28 DIAGNOSIS — Z98.84 S/P GASTRIC BYPASS: ICD-10-CM

## 2024-05-28 DIAGNOSIS — E66.9 CLASS 1 OBESITY WITHOUT SERIOUS COMORBIDITY WITH BODY MASS INDEX (BMI) OF 33.0 TO 33.9 IN ADULT, UNSPECIFIED OBESITY TYPE: Primary | ICD-10-CM

## 2024-05-28 DIAGNOSIS — Z71.3 ENCOUNTER FOR WEIGHT LOSS COUNSELING: ICD-10-CM

## 2024-05-28 PROCEDURE — 99213 OFFICE O/P EST LOW 20 MIN: CPT | Performed by: NURSE PRACTITIONER

## 2024-05-28 RX ORDER — SEMAGLUTIDE 0.5 MG/.5ML
0.5 INJECTION, SOLUTION SUBCUTANEOUS
Qty: 2 ML | Refills: 0 | Status: SHIPPED | OUTPATIENT
Start: 2024-05-28

## 2024-05-28 RX ORDER — SEMAGLUTIDE 0.25 MG/.5ML
0.25 INJECTION, SOLUTION SUBCUTANEOUS
Qty: 2 ML | Refills: 0 | Status: SHIPPED | OUTPATIENT
Start: 2024-05-28

## 2024-05-28 RX ORDER — SEMAGLUTIDE 1 MG/.5ML
1 INJECTION, SOLUTION SUBCUTANEOUS
Qty: 2 ML | Refills: 0 | Status: SHIPPED | OUTPATIENT
Start: 2024-05-28

## 2024-05-28 ASSESSMENT — ENCOUNTER SYMPTOMS
ABDOMINAL PAIN: 0
DIARRHEA: 0
NAUSEA: 1
VOMITING: 1
SHORTNESS OF BREATH: 0
COUGH: 0
CONSTIPATION: 0

## 2024-05-28 NOTE — PROGRESS NOTES
Bariatric Postoperative Progress Note    Chief Complaint   Patient presents with    Follow-up     LGBP 11/22/2021       History of Present Illness  The patient is a 48-year-old female presenting for weight management. She is status post laparoscopic gastric bypass surgery performed on 11/22/2021 and was recently prescribed Contrave.    The patient discontinued the use of Contrave after 1.5 weeks due to adverse effects including dizziness, nausea, vomiting and exacerbation of her migraines irrespective of her fluid intake. Symptoms resolved after discontinuation of Contrave.  There have been no alterations in her medication regimen. She maintains a healthy diet, ensures adequate hydration, takes vitamins, and maintains an active lifestyle.    See nurse note for additional subjective information.         5/28/2024    10:30 AM 4/16/2024     9:04 AM 2/20/2024     8:48 AM 1/2/2024     9:00 AM 11/28/2023     9:09 AM 11/22/2022     9:51 AM 5/24/2022    10:27 AM   Weight Loss Metrics   Pre-op weight (manual entry) 284 lbs 284 lbs 284 lbs 284 lbs 284 lbs     Height 5' 3\" 5' 3\" 5' 3\" 5' 3\" 5' 3\" 5' 3\" 5' 3\"   Weight - Scale 193 lbs 192 lbs 187 lbs 184 lbs 185 lbs 187 lbs 211 lbs   BMI (Calculated) 34.3 kg/m2 34.1 kg/m2 33.2 kg/m2 32.7 kg/m2 32.8 kg/m2 33.2 kg/m2 37.5 kg/m2   Ideal body weight (manual entry) 128 lbs 128 lbs 128 lbs 128 lbs 128 lbs     EBW in lbs. 156 156 156 156 156     Weight loss to date in lbs. 91 92 97 100 99     Percent weight loss (%) 32.04 % 32.39 % 34.15 % 35.21 % 34.86 %     Percent EBW loss (%) 58.3 % 59 % 62.2 % 64.1 % 63.5 %           Past Medical History:   Diagnosis Date    Asthma     Diabetes (HCC)     NIDDM    Hypertension     Multiple gastric ulcers  2015 ,01/2021    Perforated 2015    PCOS (polycystic ovarian syndrome)     Stroke (HCC) 01/2023       Past Surgical History:   Procedure Laterality Date    CARPAL TUNNEL RELEASE Bilateral 2017    CHOLECYSTECTOMY  2000    GI      LITHOTRIPSY  2013

## 2024-05-28 NOTE — PROGRESS NOTES
Bariatric Postoperative Nurse Note      Alice Qureshi is a 48 y.o. female status post laparoscopic gastric bypass surgery performed on 11/22/2021.    All Post-Ops (including two weeks)  -# of grams of protein daily? unknown  -sources of protein? Fish, chicken, yogurt  -# of oz of sugar free fluids from all sources daily?  64 plus  -Nausea? Yes  -Vomiting? No  -Difficulty swallow/food sticking? No  -Heartburn/regurgitation? No  -Character of bowel movements (diarrhea/constipation/bloody stools?) regular  -Which multivitamin product are you taking? Flintstones   -What dose and how frequently are you taking calcium citrate? twice  - from any iron-containing multivitamin by 2 hours? No  -Ulcer risk exposures:   NSAID No  Tobacco No  Alcohol No  Steroids Yes  -Minutes of physical activity and what type? walk  and house cleaning everyday.

## 2024-06-12 ENCOUNTER — CLINICAL DOCUMENTATION (OUTPATIENT)
Facility: HOSPITAL | Age: 49
End: 2024-06-12

## 2024-06-12 NOTE — PROGRESS NOTES
Patient was unable to meet with RD on the phone for this visit.  RD sent an e-mail to reschedule this visit.    Laura Reyes RD

## 2024-07-16 ENCOUNTER — OFFICE VISIT (OUTPATIENT)
Age: 49
End: 2024-07-16
Payer: OTHER GOVERNMENT

## 2024-07-16 VITALS
BODY MASS INDEX: 33.49 KG/M2 | HEIGHT: 63 IN | WEIGHT: 189 LBS | RESPIRATION RATE: 16 BRPM | DIASTOLIC BLOOD PRESSURE: 95 MMHG | HEART RATE: 63 BPM | SYSTOLIC BLOOD PRESSURE: 101 MMHG | TEMPERATURE: 98.1 F | OXYGEN SATURATION: 98 %

## 2024-07-16 DIAGNOSIS — Z79.899 FOLLOW-UP ENCOUNTER INVOLVING MEDICATION: ICD-10-CM

## 2024-07-16 DIAGNOSIS — E66.9 CLASS 1 OBESITY WITHOUT SERIOUS COMORBIDITY WITH BODY MASS INDEX (BMI) OF 33.0 TO 33.9 IN ADULT, UNSPECIFIED OBESITY TYPE: Primary | ICD-10-CM

## 2024-07-16 DIAGNOSIS — Z71.3 ENCOUNTER FOR WEIGHT LOSS COUNSELING: ICD-10-CM

## 2024-07-16 DIAGNOSIS — R51.9 CHRONIC NONINTRACTABLE HEADACHE, UNSPECIFIED HEADACHE TYPE: ICD-10-CM

## 2024-07-16 DIAGNOSIS — Z98.84 S/P GASTRIC BYPASS: ICD-10-CM

## 2024-07-16 DIAGNOSIS — G89.29 CHRONIC NONINTRACTABLE HEADACHE, UNSPECIFIED HEADACHE TYPE: ICD-10-CM

## 2024-07-16 PROCEDURE — 99213 OFFICE O/P EST LOW 20 MIN: CPT | Performed by: NURSE PRACTITIONER

## 2024-07-16 RX ORDER — SEMAGLUTIDE 1.7 MG/.75ML
1.7 INJECTION, SOLUTION SUBCUTANEOUS
Qty: 3 ML | Refills: 0 | Status: SHIPPED | OUTPATIENT
Start: 2024-07-16

## 2024-07-16 RX ORDER — TOPIRAMATE 50 MG/1
50 TABLET, FILM COATED ORAL DAILY
Qty: 90 TABLET | Refills: 1 | Status: SHIPPED | OUTPATIENT
Start: 2024-07-16

## 2024-07-16 RX ORDER — SEMAGLUTIDE 2.4 MG/.75ML
2.4 INJECTION, SOLUTION SUBCUTANEOUS
Qty: 3 ML | Refills: 1 | Status: SHIPPED | OUTPATIENT
Start: 2024-07-16

## 2024-07-17 ASSESSMENT — ENCOUNTER SYMPTOMS
COUGH: 0
VOMITING: 0
ABDOMINAL PAIN: 0
NAUSEA: 0
DIARRHEA: 0
SHORTNESS OF BREATH: 0
CONSTIPATION: 1

## 2024-07-17 NOTE — PROGRESS NOTES
Bariatric Postoperative Nurse Note      Alice Qureshi is a 48 y.o. female status post laparoscopic gastric bypass surgery performed on 11/22/2021.        All Post-Ops (including two weeks)  -# of grams of protein daily? unknown  -sources of protein? Fish, chicken, turkey  -# of oz of sugar free fluids from all sources daily?   oz  -Nausea? No  -Vomiting? No  -Difficulty swallow/food sticking? No  -Heartburn/regurgitation? No  -Character of bowel movements (diarrhea/constipation/bloody stools?) alternating between constipation and regularity  -Which multivitamin product are you taking?  Bariatric     -What dose and how frequently are you taking calcium citrate? 2 times daily  - from any iron-containing multivitamin by 2 hours? Yes  -Ulcer risk exposures:   NSAID No  Tobacco No  Alcohol No  Steroids No  -Minutes of physical activity and what type? 5 days a week [employer] and [employer]       
 2013    [place] UNLISTED PROCEDURE ABDOMEN PERITONEUM & OMENTUM  2015    Perforated ulcer repair       Current Outpatient Medications   Medication Sig Dispense Refill    topiramate (TOPAMAX) 50 MG tablet Take 1 tablet by mouth daily 90 tablet 1    Semaglutide-Weight Management (WEGOVY) 1.7 MG/0.75ML SOAJ SC injection Inject 1.7 mg into the skin every 7 days Weeks 13-16 3 mL 0    Semaglutide-Weight Management (WEGOVY) 2.4 MG/0.75ML SOAJ SC injection Inject 2.4 mg into the skin every 7 days Weeks 17 and beyond 3 mL 1    Semaglutide-Weight Management (WEGOVY) 1 MG/0.5ML SOAJ SC injection Inject 1 mg into the skin every 7 days Weeks 9-12 2 mL 0    furosemide (LASIX) 20 MG tablet Take 1 tablet by mouth daily      triamcinolone (KENALOG) 0.025 % cream Apply topically to affected area twice daily. Do not use longer than two weeks. 15 g 1    nystatin (MYCOSTATIN) 198729 UNIT/GM cream Apply topically to affected area 2 times daily. 30 g 1    nystatin 759186 UNIT/GM powder APPLY TO THE AFFECTED AREA FOUR TIMES DAILY 60 g 2    Multiple Vitamins-Minerals (BARIATRIC MULTIVITAMINS/IRON PO) Take by mouth      BIOTIN PO Take by mouth      amphetamine-dextroamphetamine (ADDERALL) 10 MG tablet Take 3 tablets by mouth 2 times daily.      ascorbic acid (VITAMIN C) 500 MG tablet Take by mouth      calcium citrate (CALCITRATE) 950 (200 Ca) MG tablet Take by mouth daily      cyanocobalamin 500 MCG tablet Take 1 tablet by mouth daily      ergocalciferol (ERGOCALCIFEROL) 1.25 MG (61176 UT) capsule Take 1 capsule by mouth every 7 days      escitalopram (LEXAPRO) 10 MG tablet Take 1 tablet by mouth daily      ferrous sulfate (IRON 325) 325 (65 Fe) MG tablet Take by mouth every morning (before breakfast)      losartan (COZAAR) 50 MG tablet Take 1 tablet by mouth daily      metoprolol succinate (TOPROL XL) 50 MG extended release tablet Take 1 tablet by mouth daily      omeprazole (PRILOSEC) 40 MG delayed release capsule Take 1 capsule by mouth

## 2024-09-24 ENCOUNTER — OFFICE VISIT (OUTPATIENT)
Age: 49
End: 2024-09-24

## 2024-09-24 VITALS
WEIGHT: 169 LBS | DIASTOLIC BLOOD PRESSURE: 76 MMHG | HEIGHT: 63 IN | OXYGEN SATURATION: 99 % | HEART RATE: 86 BPM | SYSTOLIC BLOOD PRESSURE: 112 MMHG | TEMPERATURE: 97 F | BODY MASS INDEX: 29.95 KG/M2

## 2024-09-24 DIAGNOSIS — G89.29 CHRONIC NONINTRACTABLE HEADACHE, UNSPECIFIED HEADACHE TYPE: ICD-10-CM

## 2024-09-24 DIAGNOSIS — R51.9 CHRONIC NONINTRACTABLE HEADACHE, UNSPECIFIED HEADACHE TYPE: ICD-10-CM

## 2024-09-24 DIAGNOSIS — Z86.39 HX OF OBESITY: ICD-10-CM

## 2024-09-24 DIAGNOSIS — E66.3 OVERWEIGHT: ICD-10-CM

## 2024-09-24 DIAGNOSIS — K91.2 POSTOPERATIVE INTESTINAL MALABSORPTION: ICD-10-CM

## 2024-09-24 DIAGNOSIS — Z98.84 S/P GASTRIC BYPASS: ICD-10-CM

## 2024-09-24 RX ORDER — ASPIRIN 81 MG/1
81 TABLET, CHEWABLE ORAL DAILY
COMMUNITY

## 2024-09-24 RX ORDER — ATORVASTATIN CALCIUM 40 MG/1
1 TABLET, FILM COATED ORAL
COMMUNITY
Start: 2024-08-16

## 2024-09-24 RX ORDER — SEMAGLUTIDE 2.4 MG/.75ML
2.4 INJECTION, SOLUTION SUBCUTANEOUS
Qty: 6 ML | Refills: 1 | Status: SHIPPED | OUTPATIENT
Start: 2024-09-24

## 2024-09-24 RX ORDER — EMPAGLIFLOZIN 10 MG/1
10 TABLET, FILM COATED ORAL DAILY
COMMUNITY
Start: 2024-08-16

## 2024-09-24 RX ORDER — TOPIRAMATE 50 MG/1
50 TABLET, FILM COATED ORAL DAILY
Qty: 90 TABLET | Refills: 1 | Status: SHIPPED | OUTPATIENT
Start: 2024-09-24

## 2024-09-24 ASSESSMENT — ENCOUNTER SYMPTOMS
NAUSEA: 0
COUGH: 0
CONSTIPATION: 1
VOMITING: 0
ABDOMINAL PAIN: 0
DIARRHEA: 0
SHORTNESS OF BREATH: 1

## 2024-11-25 ENCOUNTER — HOSPITAL ENCOUNTER (OUTPATIENT)
Facility: HOSPITAL | Age: 49
Discharge: HOME | End: 2024-11-28
Payer: COMMERCIAL

## 2024-11-25 DIAGNOSIS — Z98.84 S/P GASTRIC BYPASS: ICD-10-CM

## 2024-11-25 DIAGNOSIS — E66.3 OVERWEIGHT: ICD-10-CM

## 2024-11-25 DIAGNOSIS — Z86.39 HX OF OBESITY: ICD-10-CM

## 2024-11-25 DIAGNOSIS — K91.2 POSTOPERATIVE INTESTINAL MALABSORPTION: ICD-10-CM

## 2024-11-25 LAB
25(OH)D3 SERPL-MCNC: 34.5 NG/ML (ref 30–100)
ALBUMIN SERPL-MCNC: 3.4 G/DL (ref 3.4–5)
ALBUMIN/GLOB SERPL: 1.1 (ref 0.8–1.7)
ALP SERPL-CCNC: 96 U/L (ref 45–117)
ALT SERPL-CCNC: 24 U/L (ref 13–56)
ANION GAP SERPL CALC-SCNC: 6 MMOL/L (ref 3–18)
AST SERPL-CCNC: 17 U/L (ref 10–38)
BILIRUB SERPL-MCNC: 0.6 MG/DL (ref 0.2–1)
BUN SERPL-MCNC: 14 MG/DL (ref 7–18)
BUN/CREAT SERPL: 13 (ref 12–20)
CALCIUM SERPL-MCNC: 8.9 MG/DL (ref 8.5–10.1)
CHLORIDE SERPL-SCNC: 109 MMOL/L (ref 100–111)
CO2 SERPL-SCNC: 26 MMOL/L (ref 21–32)
CREAT SERPL-MCNC: 1.06 MG/DL (ref 0.6–1.3)
FERRITIN SERPL-MCNC: 41 NG/ML (ref 8–388)
FOLATE SERPL-MCNC: >20 NG/ML (ref 3.1–17.5)
GLOBULIN SER CALC-MCNC: 3.1 G/DL (ref 2–4)
GLUCOSE SERPL-MCNC: 86 MG/DL (ref 74–99)
IRON SERPL-MCNC: 64 UG/DL (ref 50–175)
POTASSIUM SERPL-SCNC: 4.1 MMOL/L (ref 3.5–5.5)
PROT SERPL-MCNC: 6.5 G/DL (ref 6.4–8.2)
SODIUM SERPL-SCNC: 141 MMOL/L (ref 136–145)
VIT B12 SERPL-MCNC: 199 PG/ML (ref 211–911)

## 2024-11-25 PROCEDURE — 82306 VITAMIN D 25 HYDROXY: CPT

## 2024-11-25 PROCEDURE — 82746 ASSAY OF FOLIC ACID SERUM: CPT

## 2024-11-25 PROCEDURE — 82607 VITAMIN B-12: CPT

## 2024-11-25 PROCEDURE — 36415 COLL VENOUS BLD VENIPUNCTURE: CPT

## 2024-11-25 PROCEDURE — 80053 COMPREHEN METABOLIC PANEL: CPT

## 2024-11-25 PROCEDURE — 84425 ASSAY OF VITAMIN B-1: CPT

## 2024-11-25 PROCEDURE — 83540 ASSAY OF IRON: CPT

## 2024-11-25 PROCEDURE — 82728 ASSAY OF FERRITIN: CPT

## 2024-11-26 ENCOUNTER — OFFICE VISIT (OUTPATIENT)
Age: 49
End: 2024-11-26
Payer: COMMERCIAL

## 2024-11-26 VITALS
RESPIRATION RATE: 16 BRPM | DIASTOLIC BLOOD PRESSURE: 71 MMHG | HEIGHT: 63 IN | OXYGEN SATURATION: 99 % | WEIGHT: 169 LBS | SYSTOLIC BLOOD PRESSURE: 104 MMHG | TEMPERATURE: 98.2 F | BODY MASS INDEX: 29.95 KG/M2 | HEART RATE: 94 BPM

## 2024-11-26 DIAGNOSIS — E53.8 B12 DEFICIENCY: ICD-10-CM

## 2024-11-26 DIAGNOSIS — E66.3 OVERWEIGHT: ICD-10-CM

## 2024-11-26 DIAGNOSIS — K91.2 POSTOPERATIVE INTESTINAL MALABSORPTION: Primary | ICD-10-CM

## 2024-11-26 DIAGNOSIS — R20.2 PARESTHESIA: ICD-10-CM

## 2024-11-26 DIAGNOSIS — Z98.84 S/P GASTRIC BYPASS: ICD-10-CM

## 2024-11-26 DIAGNOSIS — Z79.899 FOLLOW-UP ENCOUNTER INVOLVING MEDICATION: ICD-10-CM

## 2024-11-26 DIAGNOSIS — Z71.3 ENCOUNTER FOR WEIGHT LOSS COUNSELING: ICD-10-CM

## 2024-11-26 DIAGNOSIS — Z86.39 HX OF OBESITY: ICD-10-CM

## 2024-11-26 PROCEDURE — 96372 THER/PROPH/DIAG INJ SC/IM: CPT | Performed by: NURSE PRACTITIONER

## 2024-11-26 PROCEDURE — 99213 OFFICE O/P EST LOW 20 MIN: CPT | Performed by: NURSE PRACTITIONER

## 2024-11-26 RX ORDER — THIAMINE HYDROCHLORIDE 100 MG/ML
200 INJECTION, SOLUTION INTRAMUSCULAR; INTRAVENOUS ONCE
Status: COMPLETED | OUTPATIENT
Start: 2024-11-26 | End: 2024-11-26

## 2024-11-26 RX ORDER — CYANOCOBALAMIN 1000 UG/ML
1000 INJECTION, SOLUTION INTRAMUSCULAR; SUBCUTANEOUS ONCE
Status: COMPLETED | OUTPATIENT
Start: 2024-11-26 | End: 2024-11-26

## 2024-11-26 RX ADMIN — THIAMINE HYDROCHLORIDE 200 MG: 100 INJECTION, SOLUTION INTRAMUSCULAR; INTRAVENOUS at 11:49

## 2024-11-26 RX ADMIN — CYANOCOBALAMIN 1000 MCG: 1000 INJECTION, SOLUTION INTRAMUSCULAR; SUBCUTANEOUS at 11:48

## 2024-11-26 NOTE — PROGRESS NOTES
Bariatric Postoperative Progress Note    Chief Complaint   Patient presents with    Follow-up     LGBP 11/2021       History of Present Illness  The patient is a 49-year-old female presenting for annual bariatric surgery follow-up and weight management. She is status post laparoscopic gastric bypass surgery performed on 11/22/2021 and is currently on Wegovy 2.4 mg. She is accompanied by her daughter to the visit today.     She is currently taking Wegovy, which has been effective in suppressing her appetite. She maintains a diet of small meals every 2 to 3 hours and ensures adequate protein and fluid intake. She is taking BariMelts for her bariatric vitamin supplementation. Her goal weight remains between 150 to 155 pounds.    She has been participating in cardiac rehabilitation, which she believes has led to muscle gain as the scale is not decreasing but she feels that her clothes are fitting more loosely.    Continues to experience itching and skin irritation under pannus from excess skin chafing even with fastidious skin care, use of support garments, and Nystatin.    She has lost 24 lbs since starting Wegovy which is 12% of her starting weight of 193 lbs.     See nurse note for additional subjective information.         11/26/2024    10:33 AM 9/24/2024    10:00 AM 7/16/2024    10:50 AM 5/28/2024    10:30 AM 4/16/2024     9:04 AM 2/20/2024     8:48 AM 1/2/2024     9:00 AM   Weight Loss Metrics   Pre-op weight (manual entry) 284 lbs 284 lbs 284 lbs 284 lbs 284 lbs 284 lbs 284 lbs   Height 5' 3\" 5' 3\" 5' 3\" 5' 3\" 5' 3\" 5' 3\" 5' 3\"   Weight - Scale 169 lbs 169 lbs 189 lbs 193 lbs 192 lbs 187 lbs 184 lbs   BMI (Calculated) 30 kg/m2 30 kg/m2 33.5 kg/m2 34.3 kg/m2 34.1 kg/m2 33.2 kg/m2 32.7 kg/m2   Ideal body weight (manual entry) 128 lbs 128 lbs 128 lbs 128 lbs 128 lbs 128 lbs 128 lbs   EBW in lbs. 156 156 156 156 156 156 156   Weight loss to date in lbs. 115 115 95 91 92 97 100   Percent weight loss (%) 40.49 % 40.49

## 2024-11-26 NOTE — PROGRESS NOTES
After obtaining consent, and per orders of Emeli Townsend NP, injection of Vitamin B1 and Vitamin B12 given in Right deltoid and left deltoid by Jennifer Mantilla LPN. Patient tolerated well.

## 2024-11-26 NOTE — PROGRESS NOTES
Bariatric Postoperative Nurse Note      Alice Titus is a 49 y.o. female status post laparoscopic gastric bypass surgery performed on 11/2021.      All Post-Ops (including two weeks)  -# of grams of protein daily? unknown  -sources of protein? Fish, yogurt, chicken and pork  -# of oz of sugar free fluids from all sources daily?   oz  -Nausea? No  -Vomiting? No  -Difficulty swallow/food sticking? No  -Heartburn/regurgitation? No  -Character of bowel movements (diarrhea/constipation/bloody stools?) alternating between constipation and regularity  -Which multivitamin product are you taking?  Ady melt    -What dose and how frequently are you taking calcium citrate? once daily  - from any iron-containing multivitamin by 2 hours? Yes  -Ulcer risk exposures:   NSAID No  Tobacco No  Alcohol No  Steroids No  -Minutes of physical activity and what type? walking and cardiac rehab

## 2024-11-29 ASSESSMENT — ENCOUNTER SYMPTOMS
VOMITING: 0
DIARRHEA: 0
ABDOMINAL DISTENTION: 0
WHEEZING: 0
NAUSEA: 0
BLOOD IN STOOL: 0
SHORTNESS OF BREATH: 0
ABDOMINAL PAIN: 0
ROS SKIN COMMENTS: SEE HPI
COUGH: 0
EYES NEGATIVE: 1
CONSTIPATION: 0

## 2024-12-03 LAB — VIT B1 BLD-SCNC: 91.8 NMOL/L (ref 66.5–200)

## 2024-12-11 ENCOUNTER — CLINICAL DOCUMENTATION (OUTPATIENT)
Facility: HOSPITAL | Age: 49
End: 2024-12-11

## 2024-12-11 NOTE — PROGRESS NOTES
BRIDGET has called and LVM for patient to add 1,000 mcg B12, 100 mg B1 and 5,000 iu D3 to the Barimelts she takes.   Laura Reyes RD

## 2025-01-14 DIAGNOSIS — E66.3 OVERWEIGHT: ICD-10-CM

## 2025-01-14 DIAGNOSIS — Z86.39 HX OF OBESITY: ICD-10-CM

## 2025-01-14 RX ORDER — SEMAGLUTIDE 2.4 MG/.75ML
2.4 INJECTION, SOLUTION SUBCUTANEOUS
Qty: 6 ML | Refills: 1 | Status: SHIPPED | OUTPATIENT
Start: 2025-01-14

## 2025-02-25 ENCOUNTER — OFFICE VISIT (OUTPATIENT)
Age: 50
End: 2025-02-25
Payer: COMMERCIAL

## 2025-02-25 VITALS
TEMPERATURE: 98.6 F | RESPIRATION RATE: 16 BRPM | BODY MASS INDEX: 31.18 KG/M2 | DIASTOLIC BLOOD PRESSURE: 85 MMHG | SYSTOLIC BLOOD PRESSURE: 114 MMHG | WEIGHT: 176 LBS | HEART RATE: 70 BPM | OXYGEN SATURATION: 100 % | HEIGHT: 63 IN

## 2025-02-25 DIAGNOSIS — Z71.3 ENCOUNTER FOR WEIGHT LOSS COUNSELING: ICD-10-CM

## 2025-02-25 DIAGNOSIS — Z98.84 S/P GASTRIC BYPASS: ICD-10-CM

## 2025-02-25 DIAGNOSIS — Z79.899 FOLLOW-UP ENCOUNTER INVOLVING MEDICATION: ICD-10-CM

## 2025-02-25 DIAGNOSIS — E66.811 CLASS 1 OBESITY WITH SERIOUS COMORBIDITY AND BODY MASS INDEX (BMI) OF 31.0 TO 31.9 IN ADULT, UNSPECIFIED OBESITY TYPE: Primary | ICD-10-CM

## 2025-02-25 DIAGNOSIS — Z86.73 HISTORY OF CVA (CEREBROVASCULAR ACCIDENT): ICD-10-CM

## 2025-02-25 PROCEDURE — 99213 OFFICE O/P EST LOW 20 MIN: CPT | Performed by: NURSE PRACTITIONER

## 2025-02-25 RX ORDER — SEMAGLUTIDE 2.4 MG/.75ML
2.4 INJECTION, SOLUTION SUBCUTANEOUS
Qty: 3 ML | Refills: 1 | Status: SHIPPED | OUTPATIENT
Start: 2025-02-25

## 2025-02-25 RX ORDER — SEMAGLUTIDE 1 MG/.5ML
1 INJECTION, SOLUTION SUBCUTANEOUS
Qty: 2 ML | Refills: 0 | Status: SHIPPED | OUTPATIENT
Start: 2025-02-25

## 2025-02-25 RX ORDER — SEMAGLUTIDE 1.7 MG/.75ML
1.7 INJECTION, SOLUTION SUBCUTANEOUS
Qty: 3 ML | Refills: 0 | Status: SHIPPED | OUTPATIENT
Start: 2025-02-25

## 2025-02-25 NOTE — PROGRESS NOTES
Bariatric Postoperative Nurse Note      Alice Qureshi is a 49 y.o. female status post laparoscopic gastric bypass surgery performed on 11/22/2021.    All Post-Ops (including two weeks)  -# of grams of protein daily? unknown  -sources of protein? Yogurt, cheese, fish, egg, steak, chicken, and nuts  -# of oz of sugar free fluids from all sources daily?   oz  -Nausea? No  -Vomiting? No  -Difficulty swallow/food sticking? No  -Heartburn/regurgitation? No  -Character of bowel movements (diarrhea/constipation/bloody stools?) regular  -Which multivitamin product are you taking?  Kaiser bae    -What dose and how frequently are you taking calcium citrate? once daily  - from any iron-containing multivitamin by 2 hours? Yes  -Ulcer risk exposures:   NSAID No  Tobacco No  Alcohol No  Steroids No  -Minutes of physical activity and what type? walking, biking and gym 5 days a week.

## 2025-02-28 ASSESSMENT — ENCOUNTER SYMPTOMS
VOMITING: 0
NAUSEA: 0
SHORTNESS OF BREATH: 0
ABDOMINAL PAIN: 0
COUGH: 0
CONSTIPATION: 0
ROS SKIN COMMENTS: SEE HPI
DIARRHEA: 0

## 2025-02-28 NOTE — PROGRESS NOTES
Bariatric Postoperative Progress Note    Chief Complaint   Patient presents with    Follow-up     LGBP 11/22/2021       History of Present Illness  The patient is a 49-year-old female presenting for weight management. She is status post laparoscopic gastric bypass surgery performed on 11/22/2021 and is currently on Wegovy 2.4 mg.    She has been experiencing difficulties with her insurance coverage, resulting in the inability to refill her Wegovy prescription. She no longer has  and is now covered by [employer]. Her last dose of Wegovy was administered in January 2025, specifically on the final Monday of the month. She reports no adverse effects from the medication. She maintains a regular exercise routine at [employer], attending five days a week, although her activities are currently limited due to shoulder pain. She continues to meet her nutritional needs through adequate protein intake and hydration, and also takes her bariatric vitamins.     She has a history of stroke and congestive heart failure and is under the care of a cardiologist, KEVIN Tinajero, who is aware of her current medication regimen. She recently completed a cardiac rehabilitation program.    Continues to experience itching and skin irritation under pannus from excess skin chafing even with fastidious skin care, use of support garments, and Nystatin.     She has lost 17 lbs since starting Wegovy which is 9% of her starting weight of 193 lbs.     See nurse note for additional subjective information.         2/25/2025     9:40 AM 11/26/2024    10:33 AM 9/24/2024    10:00 AM 7/16/2024    10:50 AM 5/28/2024    10:30 AM 4/16/2024     9:04 AM 2/20/2024     8:48 AM   Weight Loss Metrics   Pre-op weight (manual entry) 284 lbs 284 lbs 284 lbs 284 lbs 284 lbs 284 lbs 284 lbs   Height 5' 3\" 5' 3\" 5' 3\" 5' 3\" 5' 3\" 5' 3\" 5' 3\"   Weight - Scale 176 lbs 169 lbs 169 lbs 189 lbs 193 lbs 192 lbs 187 lbs   BMI (Calculated) 31.2 kg/m2 30 kg/m2 30

## 2025-05-27 ENCOUNTER — OFFICE VISIT (OUTPATIENT)
Age: 50
End: 2025-05-27
Payer: COMMERCIAL

## 2025-05-27 DIAGNOSIS — R51.9 CHRONIC NONINTRACTABLE HEADACHE, UNSPECIFIED HEADACHE TYPE: ICD-10-CM

## 2025-05-27 DIAGNOSIS — L98.7 EXCESSIVE AND REDUNDANT SKIN AND SUBCUTANEOUS TISSUE: ICD-10-CM

## 2025-05-27 DIAGNOSIS — E66.811 CLASS 1 OBESITY WITHOUT SERIOUS COMORBIDITY WITH BODY MASS INDEX (BMI) OF 33.0 TO 33.9 IN ADULT, UNSPECIFIED OBESITY TYPE: Primary | ICD-10-CM

## 2025-05-27 DIAGNOSIS — Z86.73 HISTORY OF CVA (CEREBROVASCULAR ACCIDENT): ICD-10-CM

## 2025-05-27 DIAGNOSIS — Z71.3 ENCOUNTER FOR WEIGHT LOSS COUNSELING: ICD-10-CM

## 2025-05-27 DIAGNOSIS — G89.29 CHRONIC NONINTRACTABLE HEADACHE, UNSPECIFIED HEADACHE TYPE: ICD-10-CM

## 2025-05-27 DIAGNOSIS — Z98.84 S/P GASTRIC BYPASS: ICD-10-CM

## 2025-05-27 PROCEDURE — 99214 OFFICE O/P EST MOD 30 MIN: CPT | Performed by: NURSE PRACTITIONER

## 2025-05-27 RX ORDER — NYSTATIN 100000 [USP'U]/G
POWDER TOPICAL
Qty: 60 G | Refills: 2 | Status: SHIPPED | OUTPATIENT
Start: 2025-05-27

## 2025-05-27 RX ORDER — NYSTATIN 100000 U/G
CREAM TOPICAL
Qty: 30 G | Refills: 1 | Status: SHIPPED | OUTPATIENT
Start: 2025-05-27

## 2025-05-27 RX ORDER — TOPIRAMATE 50 MG/1
50 TABLET, FILM COATED ORAL DAILY
Qty: 90 TABLET | Refills: 1 | Status: SHIPPED | OUTPATIENT
Start: 2025-05-27

## 2025-05-27 NOTE — PROGRESS NOTES
Alice Qureshi, was evaluated through a synchronous (real-time) audio-video encounter. The patient (or guardian if applicable) is aware that this is a billable service, which includes applicable co-pays. This Virtual Visit was conducted with patient's (and/or legal guardian's) consent. Patient identification was verified, and a caregiver was present when appropriate.   The patient was located at Home: [address] [place] [id number]  Provider was located at Facility (Appt Dept): [address], [address]  [place] [address]  Confirm you are appropriately licensed, registered, or certified to deliver care in the state where the patient is located as indicated above. If you are not or unsure, please re-schedule the visit: Yes, I confirm.      Total time spent for this encounter: Not billed by time    --COLBY Carter - NP on 6/2/2025 at 9:08 AM    An electronic signature was used to authenticate this note.       Bariatric Postoperative Progress Note    Chief Complaint   Patient presents with    Follow-up     LGBP, 11/22/21       History of Present Illness  The patient is a 49-year-old female presenting via virtual visit for weight management. She is status post laparoscopic gastric bypass surgery performed on 11/22/2021.    The primary reason for this visit is significant weight gain since the last consultation. Her current weight is 199 pounds, up from 176 pounds at our last visit in February. She has been monitoring her weight daily and has increased her Lasix dosage to 2 tablets every other day to manage fluid retention. Despite this, she reports minimal fluid weight gain. She maintains a high-protein diet, ensures adequate hydration, and engages in regular physical activity, including walking and cycling.    She has been off Wegovy since 01/2025 and reports she has encountered difficulties obtaining a prescription for Wegovy from her cardiologist for cardiovascular protective

## 2025-05-27 NOTE — PROGRESS NOTES
Bariatric Postoperative Nurse Note      Alice Qureshi is a 49 y.o. female status post laparoscopic gastric bypass surgery performed on 11/22/2021.    All Post-Ops (including two weeks)  -# of grams of protein daily? Not sure  -sources of protein? Fish, chicken, eggs, yogurt.  -# of oz of sugar free fluids from all sources daily?   oz  -Nausea? Yes, recently 4 days.  -Vomiting? No  -Difficulty swallow/food sticking? No  -Heartburn/regurgitation? No  -Character of bowel movements (diarrhea/constipation/bloody stools?) regular  -Which multivitamin product are you taking? Bariatric Pal   -What dose and how frequently are you taking calcium citrate? once daily  - from any iron-containing multivitamin by 2 hours? Yes  -Ulcer risk exposures:   NSAID No  Tobacco No  Alcohol No  Steroids No  -Minutes of physical activity and what type? Walking and cycling daily.

## 2025-06-02 RX ORDER — SEMAGLUTIDE 0.5 MG/.5ML
0.5 INJECTION, SOLUTION SUBCUTANEOUS
Qty: 2 ML | Refills: 0 | Status: SHIPPED | OUTPATIENT
Start: 2025-06-02

## 2025-06-02 ASSESSMENT — ENCOUNTER SYMPTOMS
VOMITING: 0
DIARRHEA: 0
ABDOMINAL PAIN: 0
CONSTIPATION: 0
SHORTNESS OF BREATH: 0
COUGH: 0
NAUSEA: 0

## 2025-08-12 ENCOUNTER — OFFICE VISIT (OUTPATIENT)
Age: 50
End: 2025-08-12
Payer: COMMERCIAL

## 2025-08-12 VITALS
WEIGHT: 208 LBS | HEART RATE: 53 BPM | RESPIRATION RATE: 16 BRPM | BODY MASS INDEX: 36.86 KG/M2 | OXYGEN SATURATION: 100 % | SYSTOLIC BLOOD PRESSURE: 134 MMHG | DIASTOLIC BLOOD PRESSURE: 79 MMHG | HEIGHT: 63 IN | TEMPERATURE: 97.9 F

## 2025-08-12 DIAGNOSIS — E66.01 CLASS 2 SEVERE OBESITY WITH SERIOUS COMORBIDITY AND BODY MASS INDEX (BMI) OF 36.0 TO 36.9 IN ADULT, UNSPECIFIED OBESITY TYPE (HCC): ICD-10-CM

## 2025-08-12 DIAGNOSIS — E66.812 CLASS 2 SEVERE OBESITY WITH SERIOUS COMORBIDITY AND BODY MASS INDEX (BMI) OF 36.0 TO 36.9 IN ADULT, UNSPECIFIED OBESITY TYPE (HCC): ICD-10-CM

## 2025-08-12 DIAGNOSIS — Z98.84 S/P GASTRIC BYPASS: ICD-10-CM

## 2025-08-12 DIAGNOSIS — K91.2 POSTOPERATIVE INTESTINAL MALABSORPTION: Primary | ICD-10-CM

## 2025-08-12 DIAGNOSIS — Z71.3 ENCOUNTER FOR WEIGHT LOSS COUNSELING: ICD-10-CM

## 2025-08-12 DIAGNOSIS — L98.7 EXCESSIVE AND REDUNDANT SKIN AND SUBCUTANEOUS TISSUE: ICD-10-CM

## 2025-08-12 DIAGNOSIS — E66.811 CLASS 1 OBESITY WITHOUT SERIOUS COMORBIDITY WITH BODY MASS INDEX (BMI) OF 33.0 TO 33.9 IN ADULT, UNSPECIFIED OBESITY TYPE: ICD-10-CM

## 2025-08-12 PROCEDURE — 99214 OFFICE O/P EST MOD 30 MIN: CPT | Performed by: NURSE PRACTITIONER

## 2025-08-12 RX ORDER — NYSTATIN 100000 U/G
CREAM TOPICAL
Qty: 30 G | Refills: 1 | Status: SHIPPED | OUTPATIENT
Start: 2025-08-12

## 2025-08-14 ASSESSMENT — ENCOUNTER SYMPTOMS
DIARRHEA: 0
VOMITING: 0
SHORTNESS OF BREATH: 1
ABDOMINAL PAIN: 0
COUGH: 0
NAUSEA: 0
CONSTIPATION: 0

## (undated) DEVICE — INTENDED FOR TISSUE SEPARATION, AND OTHER PROCEDURES THAT REQUIRE A SHARP SURGICAL BLADE TO PUNCTURE OR CUT.: Brand: BARD-PARKER SAFETY BLADES SIZE 11, STERILE

## (undated) DEVICE — TISSUE RETRIEVAL SYSTEM: Brand: INZII RETRIEVAL SYSTEM

## (undated) DEVICE — SET SUCT IRR TIP DISP STRYKEFLOW2

## (undated) DEVICE — TAPE ADH W3INXL10YD PLAS TRNSPAR H2O RESIST HYPOALRG CURAD

## (undated) DEVICE — GARMENT,MEDLINE,DVT,INT,CALF,MED, GEN2: Brand: MEDLINE

## (undated) DEVICE — COVER,LIGHT HANDLE,FLX,1/PK: Brand: MEDLINE INDUSTRIES, INC.

## (undated) DEVICE — GLOVE SURG SZ 7 L11.33IN FNGR THK9.8MIL STRW LTX POLYMER

## (undated) DEVICE — 3M™ STERI-STRIP™ COMPOUND BENZOIN TINCTURE 40 BAGS/CARTON 4 CARTONS/CASE C1544: Brand: 3M™ STERI-STRIP™

## (undated) DEVICE — SUTURE VCRL SZ 2-0 L54IN ABSRB VLT W/O NDL POLYGLACTIN 910 J618H

## (undated) DEVICE — STAPLE INT WHT BLU G GRN BLK REINF FOR ENDOPATH ECHELON FLX

## (undated) DEVICE — HANDLE PRB DIA5MM HND CTRL PSTL GRP ENDOPATH PRB + II

## (undated) DEVICE — SOLUTION IV 1000ML 0.9% SOD CHL

## (undated) DEVICE — 4-PORT MANIFOLD: Brand: NEPTUNE 2

## (undated) DEVICE — TROCAR ENDOSCP BLDELSS 12X100 MM W/ HNDL STBL SL OPT TIP

## (undated) DEVICE — SHEAR HARMONIC ACET 5MMX36CM -- ACE PLUS

## (undated) DEVICE — BLANKET WRM AD W50XL85.8IN PACU FULL BODY FORC AIR

## (undated) DEVICE — STRIP,CLOSURE,WOUND,MEDI-STRIP,1/2X4: Brand: MEDLINE

## (undated) DEVICE — SUTURE MCRYL SZ 4-0 L27IN ABSRB UD L24MM PS-1 3/8 CIR PRIM Y935H

## (undated) DEVICE — TROCAR ENDOSCP L100MM DIA12MM STBL SL BLDELSS ENDOPATH XCEL

## (undated) DEVICE — TROCAR LAP L100MM DIA5MM BLDELSS W/ STBL SL ENDOPATH XCEL

## (undated) DEVICE — RELOAD STPL L60MM H1-2.6MM MESENTERY THN TISS WHT 6 ROW

## (undated) DEVICE — SCISSORS ENDOSCP DIA5MM CRV MPLR CAUT W/ RATCH HNDL

## (undated) DEVICE — TROCAR ENDOSCP SHFT L100MM DIA12MM INTEGR STBL ENDOPATH

## (undated) DEVICE — STAPLER SKIN L440MM 32MM LNG 12 FIRING B FRM PWR + GRIPPING

## (undated) DEVICE — STAPLER SKIN LN REINF 60 MM ECHELON ENDOPATH

## (undated) DEVICE — BLANKET WRM W29.9XL79.1IN UP BODY FORC AIR MISTRAL-AIR

## (undated) DEVICE — SUT SLK 2-0SH 30IN BLK --

## (undated) DEVICE — TRUE CONTENT TO BE POPULATED AS PART OF REBRANDING: Brand: ARGYLE

## (undated) DEVICE — GAUZE SPONGES,8 PLY: Brand: CURITY

## (undated) DEVICE — RELOAD STPL L60MM H1.5-3.6MM REG TISS BLU GRIPPING SURF B

## (undated) DEVICE — SUTURE VCRL SZ 3-0 L27IN ABSRB VLT L26MM SH 1/2 CIR J316H

## (undated) DEVICE — BAG DRNGE C650ML H SZ 5-38 MAMM TISS EXP CNTOUR PROF NACL

## (undated) DEVICE — REM POLYHESIVE ADULT PATIENT RETURN ELECTRODE: Brand: VALLEYLAB

## (undated) DEVICE — SOLUTION IRRIG 1000ML H2O STRL BLT

## (undated) DEVICE — PACK PROCEDURE SURG LAPAROSCOPY 17X7 MM BRTRC PRIMUS